# Patient Record
Sex: MALE | Race: WHITE | NOT HISPANIC OR LATINO | Employment: OTHER | URBAN - METROPOLITAN AREA
[De-identification: names, ages, dates, MRNs, and addresses within clinical notes are randomized per-mention and may not be internally consistent; named-entity substitution may affect disease eponyms.]

---

## 2020-12-07 ENCOUNTER — HOME CARE/HOSPICE - HEALTHEAST (OUTPATIENT)
Dept: HOME HEALTH SERVICES | Facility: HOME HEALTH | Age: 75
End: 2020-12-07

## 2021-05-26 ENCOUNTER — RECORDS - HEALTHEAST (OUTPATIENT)
Dept: ADMINISTRATIVE | Facility: CLINIC | Age: 76
End: 2021-05-26

## 2021-05-29 ENCOUNTER — RECORDS - HEALTHEAST (OUTPATIENT)
Dept: ADMINISTRATIVE | Facility: CLINIC | Age: 76
End: 2021-05-29

## 2021-05-31 ENCOUNTER — RECORDS - HEALTHEAST (OUTPATIENT)
Dept: ADMINISTRATIVE | Facility: CLINIC | Age: 76
End: 2021-05-31

## 2021-06-01 ENCOUNTER — RECORDS - HEALTHEAST (OUTPATIENT)
Dept: ADMINISTRATIVE | Facility: CLINIC | Age: 76
End: 2021-06-01

## 2021-06-16 PROBLEM — R06.00 DYSPNEA: Status: ACTIVE | Noted: 2020-12-01

## 2022-02-13 ENCOUNTER — HOSPITAL ENCOUNTER (INPATIENT)
Facility: HOSPITAL | Age: 77
LOS: 3 days | Discharge: HOME | DRG: 193 | End: 2022-02-16
Attending: EMERGENCY MEDICINE | Admitting: INTERNAL MEDICINE
Payer: COMMERCIAL

## 2022-02-13 ENCOUNTER — APPOINTMENT (OUTPATIENT)
Dept: CT IMAGING | Facility: HOSPITAL | Age: 77
DRG: 193 | End: 2022-02-13
Attending: STUDENT IN AN ORGANIZED HEALTH CARE EDUCATION/TRAINING PROGRAM
Payer: COMMERCIAL

## 2022-02-13 DIAGNOSIS — R33.8 POSTOPERATIVE URINARY RETENTION: ICD-10-CM

## 2022-02-13 DIAGNOSIS — J43.2 CENTRILOBULAR EMPHYSEMA (H): Primary | ICD-10-CM

## 2022-02-13 DIAGNOSIS — R09.02 HYPOXIA: ICD-10-CM

## 2022-02-13 DIAGNOSIS — N99.89 POSTOPERATIVE URINARY RETENTION: ICD-10-CM

## 2022-02-13 PROBLEM — N17.9 ACUTE KIDNEY INJURY (NONTRAUMATIC) (H): Status: ACTIVE | Noted: 2022-02-13

## 2022-02-13 PROBLEM — J41.0 SIMPLE CHRONIC BRONCHITIS (H): Status: ACTIVE | Noted: 2022-02-13

## 2022-02-13 PROBLEM — E78.00 HYPERCHOLESTEROLEMIA: Status: ACTIVE | Noted: 2022-02-13

## 2022-02-13 PROBLEM — J96.01 ACUTE RESPIRATORY FAILURE WITH HYPOXIA (H): Status: ACTIVE | Noted: 2020-12-02

## 2022-02-13 PROBLEM — J18.9 COMMUNITY ACQUIRED BILATERAL LOWER LOBE PNEUMONIA: Status: ACTIVE | Noted: 2022-02-13

## 2022-02-13 PROBLEM — H90.3 SENSORINEURAL HEARING LOSS, BILATERAL: Status: ACTIVE | Noted: 2018-07-13

## 2022-02-13 PROBLEM — M48.062 LUMBAR STENOSIS WITH NEUROGENIC CLAUDICATION: Status: ACTIVE | Noted: 2022-02-10

## 2022-02-13 PROBLEM — E11.42 DIABETIC PERIPHERAL NEUROPATHY (H): Status: ACTIVE | Noted: 2022-02-13

## 2022-02-13 PROBLEM — I10 HTN (HYPERTENSION): Status: ACTIVE | Noted: 2022-02-13

## 2022-02-13 PROBLEM — I48.91 ATRIAL FIBRILLATION (H): Status: ACTIVE | Noted: 2022-02-13

## 2022-02-13 LAB
ALBUMIN UR-MCNC: 10 MG/DL
ANION GAP SERPL CALCULATED.3IONS-SCNC: 14 MMOL/L (ref 5–18)
ANION GAP SERPL CALCULATED.3IONS-SCNC: 15 MMOL/L (ref 5–18)
APPEARANCE UR: CLEAR
BASOPHILS # BLD AUTO: 0.1 10E3/UL (ref 0–0.2)
BASOPHILS NFR BLD AUTO: 1 %
BILIRUB UR QL STRIP: NEGATIVE
BNP SERPL-MCNC: 17 PG/ML (ref 0–80)
BUN SERPL-MCNC: 45 MG/DL (ref 8–28)
BUN SERPL-MCNC: 45 MG/DL (ref 8–28)
CALCIUM SERPL-MCNC: 8.1 MG/DL (ref 8.5–10.5)
CALCIUM SERPL-MCNC: 8.5 MG/DL (ref 8.5–10.5)
CHLORIDE BLD-SCNC: 92 MMOL/L (ref 98–107)
CHLORIDE BLD-SCNC: 93 MMOL/L (ref 98–107)
CO2 SERPL-SCNC: 24 MMOL/L (ref 22–31)
CO2 SERPL-SCNC: 25 MMOL/L (ref 22–31)
COLOR UR AUTO: YELLOW
CREAT SERPL-MCNC: 2.19 MG/DL (ref 0.7–1.3)
CREAT SERPL-MCNC: 2.42 MG/DL (ref 0.7–1.3)
D DIMER PPP FEU-MCNC: 0.92 UG/ML FEU (ref 0–0.5)
EOSINOPHIL # BLD AUTO: 0.2 10E3/UL (ref 0–0.7)
EOSINOPHIL NFR BLD AUTO: 2 %
ERYTHROCYTE [DISTWIDTH] IN BLOOD BY AUTOMATED COUNT: 13.1 % (ref 10–15)
GFR SERPL CREATININE-BSD FRML MDRD: 27 ML/MIN/1.73M2
GFR SERPL CREATININE-BSD FRML MDRD: 30 ML/MIN/1.73M2
GLUCOSE BLD-MCNC: 109 MG/DL (ref 70–125)
GLUCOSE BLD-MCNC: 77 MG/DL (ref 70–125)
GLUCOSE UR STRIP-MCNC: NEGATIVE MG/DL
HCT VFR BLD AUTO: 34.5 % (ref 40–53)
HGB BLD-MCNC: 12.3 G/DL (ref 13.3–17.7)
HGB UR QL STRIP: NEGATIVE
HOLD SPECIMEN: NORMAL
HYALINE CASTS: 10 /LPF
IMM GRANULOCYTES # BLD: 0.1 10E3/UL
IMM GRANULOCYTES NFR BLD: 1 %
KETONES UR STRIP-MCNC: NEGATIVE MG/DL
LEUKOCYTE ESTERASE UR QL STRIP: NEGATIVE
LYMPHOCYTES # BLD AUTO: 2.2 10E3/UL (ref 0.8–5.3)
LYMPHOCYTES NFR BLD AUTO: 22 %
MCH RBC QN AUTO: 31.1 PG (ref 26.5–33)
MCHC RBC AUTO-ENTMCNC: 35.7 G/DL (ref 31.5–36.5)
MCV RBC AUTO: 87 FL (ref 78–100)
MONOCYTES # BLD AUTO: 1.1 10E3/UL (ref 0–1.3)
MONOCYTES NFR BLD AUTO: 11 %
MUCOUS THREADS #/AREA URNS LPF: PRESENT /LPF
NEUTROPHILS # BLD AUTO: 6.4 10E3/UL (ref 1.6–8.3)
NEUTROPHILS NFR BLD AUTO: 63 %
NITRATE UR QL: NEGATIVE
NRBC # BLD AUTO: 0 10E3/UL
NRBC BLD AUTO-RTO: 0 /100
PH UR STRIP: 5 [PH] (ref 5–7)
PLATELET # BLD AUTO: 217 10E3/UL (ref 150–450)
POTASSIUM BLD-SCNC: 3.3 MMOL/L (ref 3.5–5)
POTASSIUM BLD-SCNC: 3.7 MMOL/L (ref 3.5–5)
RBC # BLD AUTO: 3.96 10E6/UL (ref 4.4–5.9)
RBC URINE: <1 /HPF
SARS-COV-2 RNA RESP QL NAA+PROBE: NEGATIVE
SODIUM SERPL-SCNC: 131 MMOL/L (ref 136–145)
SODIUM SERPL-SCNC: 132 MMOL/L (ref 136–145)
SP GR UR STRIP: 1.02 (ref 1–1.03)
TROPONIN I SERPL-MCNC: 0.02 NG/ML (ref 0–0.29)
UROBILINOGEN UR STRIP-MCNC: <2 MG/DL
WBC # BLD AUTO: 10 10E3/UL (ref 4–11)
WBC URINE: <1 /HPF

## 2022-02-13 PROCEDURE — 80048 BASIC METABOLIC PNL TOTAL CA: CPT | Performed by: STUDENT IN AN ORGANIZED HEALTH CARE EDUCATION/TRAINING PROGRAM

## 2022-02-13 PROCEDURE — 250N000013 HC RX MED GY IP 250 OP 250 PS 637: Performed by: EMERGENCY MEDICINE

## 2022-02-13 PROCEDURE — 51798 US URINE CAPACITY MEASURE: CPT

## 2022-02-13 PROCEDURE — 36415 COLL VENOUS BLD VENIPUNCTURE: CPT | Performed by: EMERGENCY MEDICINE

## 2022-02-13 PROCEDURE — 99222 1ST HOSP IP/OBS MODERATE 55: CPT | Performed by: INTERNAL MEDICINE

## 2022-02-13 PROCEDURE — 250N000013 HC RX MED GY IP 250 OP 250 PS 637: Performed by: INTERNAL MEDICINE

## 2022-02-13 PROCEDURE — 99285 EMERGENCY DEPT VISIT HI MDM: CPT | Mod: 25

## 2022-02-13 PROCEDURE — 96367 TX/PROPH/DG ADDL SEQ IV INF: CPT

## 2022-02-13 PROCEDURE — 96376 TX/PRO/DX INJ SAME DRUG ADON: CPT

## 2022-02-13 PROCEDURE — 84484 ASSAY OF TROPONIN QUANT: CPT | Performed by: EMERGENCY MEDICINE

## 2022-02-13 PROCEDURE — 250N000011 HC RX IP 250 OP 636: Performed by: STUDENT IN AN ORGANIZED HEALTH CARE EDUCATION/TRAINING PROGRAM

## 2022-02-13 PROCEDURE — 81001 URINALYSIS AUTO W/SCOPE: CPT | Performed by: EMERGENCY MEDICINE

## 2022-02-13 PROCEDURE — 120N000001 HC R&B MED SURG/OB

## 2022-02-13 PROCEDURE — 258N000003 HC RX IP 258 OP 636: Performed by: HOSPITALIST

## 2022-02-13 PROCEDURE — 96375 TX/PRO/DX INJ NEW DRUG ADDON: CPT

## 2022-02-13 PROCEDURE — 85379 FIBRIN DEGRADATION QUANT: CPT | Performed by: EMERGENCY MEDICINE

## 2022-02-13 PROCEDURE — 258N000003 HC RX IP 258 OP 636: Performed by: STUDENT IN AN ORGANIZED HEALTH CARE EDUCATION/TRAINING PROGRAM

## 2022-02-13 PROCEDURE — 87635 SARS-COV-2 COVID-19 AMP PRB: CPT | Performed by: STUDENT IN AN ORGANIZED HEALTH CARE EDUCATION/TRAINING PROGRAM

## 2022-02-13 PROCEDURE — 85025 COMPLETE CBC W/AUTO DIFF WBC: CPT | Performed by: EMERGENCY MEDICINE

## 2022-02-13 PROCEDURE — 93005 ELECTROCARDIOGRAM TRACING: CPT | Performed by: EMERGENCY MEDICINE

## 2022-02-13 PROCEDURE — 51702 INSERT TEMP BLADDER CATH: CPT

## 2022-02-13 PROCEDURE — 71275 CT ANGIOGRAPHY CHEST: CPT

## 2022-02-13 PROCEDURE — 36415 COLL VENOUS BLD VENIPUNCTURE: CPT | Performed by: STUDENT IN AN ORGANIZED HEALTH CARE EDUCATION/TRAINING PROGRAM

## 2022-02-13 PROCEDURE — 96365 THER/PROPH/DIAG IV INF INIT: CPT | Mod: 59

## 2022-02-13 PROCEDURE — 96361 HYDRATE IV INFUSION ADD-ON: CPT

## 2022-02-13 PROCEDURE — 83880 ASSAY OF NATRIURETIC PEPTIDE: CPT | Performed by: EMERGENCY MEDICINE

## 2022-02-13 PROCEDURE — 96368 THER/DIAG CONCURRENT INF: CPT

## 2022-02-13 PROCEDURE — C9803 HOPD COVID-19 SPEC COLLECT: HCPCS

## 2022-02-13 PROCEDURE — 80048 BASIC METABOLIC PNL TOTAL CA: CPT | Performed by: EMERGENCY MEDICINE

## 2022-02-13 RX ORDER — SODIUM CHLORIDE 9 MG/ML
INJECTION, SOLUTION INTRAVENOUS ONCE
Status: COMPLETED | OUTPATIENT
Start: 2022-02-13 | End: 2022-02-13

## 2022-02-13 RX ORDER — MULTIPLE VITAMINS W/ MINERALS TAB 9MG-400MCG
1 TAB ORAL DAILY
COMMUNITY

## 2022-02-13 RX ORDER — CYCLOBENZAPRINE HCL 10 MG
10 TABLET ORAL 3 TIMES DAILY
Status: DISCONTINUED | OUTPATIENT
Start: 2022-02-14 | End: 2022-02-16 | Stop reason: HOSPADM

## 2022-02-13 RX ORDER — CYCLOBENZAPRINE HCL 10 MG
10 TABLET ORAL 3 TIMES DAILY PRN
COMMUNITY

## 2022-02-13 RX ORDER — OXYCODONE HYDROCHLORIDE 5 MG/1
5-10 TABLET ORAL EVERY 4 HOURS PRN
Status: ON HOLD | COMMUNITY
End: 2022-02-16

## 2022-02-13 RX ORDER — ASPIRIN 81 MG/1
81 TABLET ORAL EVERY EVENING
Status: DISCONTINUED | OUTPATIENT
Start: 2022-02-14 | End: 2022-02-14

## 2022-02-13 RX ORDER — OXYCODONE AND ACETAMINOPHEN 5; 325 MG/1; MG/1
1 TABLET ORAL ONCE
Status: COMPLETED | OUTPATIENT
Start: 2022-02-13 | End: 2022-02-13

## 2022-02-13 RX ORDER — MORPHINE SULFATE 4 MG/ML
4 INJECTION, SOLUTION INTRAMUSCULAR; INTRAVENOUS
Status: DISCONTINUED | OUTPATIENT
Start: 2022-02-13 | End: 2022-02-16 | Stop reason: HOSPADM

## 2022-02-13 RX ORDER — CEFTRIAXONE 1 G/1
1 INJECTION, POWDER, FOR SOLUTION INTRAMUSCULAR; INTRAVENOUS ONCE
Status: COMPLETED | OUTPATIENT
Start: 2022-02-13 | End: 2022-02-13

## 2022-02-13 RX ORDER — CEFTRIAXONE 1 G/1
1 INJECTION, POWDER, FOR SOLUTION INTRAMUSCULAR; INTRAVENOUS EVERY 24 HOURS
Status: DISCONTINUED | OUTPATIENT
Start: 2022-02-14 | End: 2022-02-16 | Stop reason: HOSPADM

## 2022-02-13 RX ORDER — LORATADINE 10 MG/1
10 TABLET ORAL DAILY PRN
COMMUNITY

## 2022-02-13 RX ORDER — ACETAMINOPHEN 325 MG/1
325-650 TABLET ORAL EVERY 6 HOURS PRN
COMMUNITY

## 2022-02-13 RX ORDER — TAMSULOSIN HYDROCHLORIDE 0.4 MG/1
0.4 CAPSULE ORAL DAILY
Status: DISCONTINUED | OUTPATIENT
Start: 2022-02-14 | End: 2022-02-16 | Stop reason: HOSPADM

## 2022-02-13 RX ORDER — OXYCODONE AND ACETAMINOPHEN 5; 325 MG/1; MG/1
1 TABLET ORAL EVERY 6 HOURS PRN
Status: DISCONTINUED | OUTPATIENT
Start: 2022-02-13 | End: 2022-02-16 | Stop reason: HOSPADM

## 2022-02-13 RX ORDER — DEXTROSE MONOHYDRATE 25 G/50ML
25-50 INJECTION, SOLUTION INTRAVENOUS
Status: DISCONTINUED | OUTPATIENT
Start: 2022-02-13 | End: 2022-02-16 | Stop reason: HOSPADM

## 2022-02-13 RX ORDER — POTASSIUM CHLORIDE 7.45 MG/ML
10 INJECTION INTRAVENOUS ONCE
Status: COMPLETED | OUTPATIENT
Start: 2022-02-13 | End: 2022-02-13

## 2022-02-13 RX ORDER — ROSUVASTATIN CALCIUM 10 MG/1
20 TABLET, COATED ORAL AT BEDTIME
Status: DISCONTINUED | OUTPATIENT
Start: 2022-02-14 | End: 2022-02-16 | Stop reason: HOSPADM

## 2022-02-13 RX ORDER — NICOTINE POLACRILEX 4 MG
15-30 LOZENGE BUCCAL
Status: DISCONTINUED | OUTPATIENT
Start: 2022-02-13 | End: 2022-02-16 | Stop reason: HOSPADM

## 2022-02-13 RX ORDER — HEPARIN SODIUM 5000 [USP'U]/.5ML
5000 INJECTION, SOLUTION INTRAVENOUS; SUBCUTANEOUS EVERY 12 HOURS
Status: DISCONTINUED | OUTPATIENT
Start: 2022-02-14 | End: 2022-02-14

## 2022-02-13 RX ORDER — IOPAMIDOL 755 MG/ML
75 INJECTION, SOLUTION INTRAVASCULAR ONCE
Status: COMPLETED | OUTPATIENT
Start: 2022-02-13 | End: 2022-02-13

## 2022-02-13 RX ORDER — GABAPENTIN 300 MG/1
300 CAPSULE ORAL 2 TIMES DAILY
COMMUNITY

## 2022-02-13 RX ORDER — SODIUM CHLORIDE AND POTASSIUM CHLORIDE 150; 900 MG/100ML; MG/100ML
INJECTION, SOLUTION INTRAVENOUS CONTINUOUS
Status: DISPENSED | OUTPATIENT
Start: 2022-02-13 | End: 2022-02-14

## 2022-02-13 RX ORDER — ASPIRIN 81 MG/1
81 TABLET ORAL EVERY EVENING
COMMUNITY

## 2022-02-13 RX ORDER — SODIUM CHLORIDE, SODIUM LACTATE, POTASSIUM CHLORIDE, CALCIUM CHLORIDE 600; 310; 30; 20 MG/100ML; MG/100ML; MG/100ML; MG/100ML
INJECTION, SOLUTION INTRAVENOUS ONCE
Status: DISCONTINUED | OUTPATIENT
Start: 2022-02-13 | End: 2022-02-13

## 2022-02-13 RX ADMIN — IOPAMIDOL 75 ML: 755 INJECTION, SOLUTION INTRAVENOUS at 18:35

## 2022-02-13 RX ADMIN — SODIUM CHLORIDE: 9 INJECTION, SOLUTION INTRAVENOUS at 20:31

## 2022-02-13 RX ADMIN — OXYCODONE HYDROCHLORIDE AND ACETAMINOPHEN 1 TABLET: 5; 325 TABLET ORAL at 14:17

## 2022-02-13 RX ADMIN — AZITHROMYCIN MONOHYDRATE 500 MG: 500 INJECTION, POWDER, LYOPHILIZED, FOR SOLUTION INTRAVENOUS at 20:31

## 2022-02-13 RX ADMIN — POTASSIUM CHLORIDE 10 MEQ: 7.46 INJECTION, SOLUTION INTRAVENOUS at 19:53

## 2022-02-13 RX ADMIN — OXYCODONE HYDROCHLORIDE AND ACETAMINOPHEN 1 TABLET: 5; 325 TABLET ORAL at 22:39

## 2022-02-13 RX ADMIN — MORPHINE SULFATE 4 MG: 4 INJECTION, SOLUTION INTRAMUSCULAR; INTRAVENOUS at 19:26

## 2022-02-13 RX ADMIN — SODIUM CHLORIDE, POTASSIUM CHLORIDE, SODIUM LACTATE AND CALCIUM CHLORIDE 1000 ML: 600; 310; 30; 20 INJECTION, SOLUTION INTRAVENOUS at 16:43

## 2022-02-13 RX ADMIN — MORPHINE SULFATE 4 MG: 4 INJECTION, SOLUTION INTRAMUSCULAR; INTRAVENOUS at 22:05

## 2022-02-13 RX ADMIN — CEFTRIAXONE SODIUM 1 G: 1 INJECTION, POWDER, FOR SOLUTION INTRAMUSCULAR; INTRAVENOUS at 19:28

## 2022-02-13 RX ADMIN — SODIUM CHLORIDE 1000 ML: 9 INJECTION, SOLUTION INTRAVENOUS at 22:40

## 2022-02-13 ASSESSMENT — ACTIVITIES OF DAILY LIVING (ADL)
ADLS_ACUITY_SCORE: 9
DEPENDENT_IADLS:: INDEPENDENT
ADLS_ACUITY_SCORE: 9

## 2022-02-13 ASSESSMENT — MIFFLIN-ST. JEOR: SCORE: 1695.72

## 2022-02-13 NOTE — ED PROVIDER NOTES
EMERGENCY DEPARTMENT ENCOUNTER      NAME: Aristeo Henry  AGE: 76 year old male  YOB: 1945  MRN: [id number]  EVALUATION DATE & TIME: 2022  1:08 PM    PCP: Kevan Srivastava    ED PROVIDER: Aristeo Rodriguez M.D.      Chief Complaint   Patient presents with     unable to void post surgery       FINAL IMPRESSION:  1. Postoperative urinary retention    2. Hypoxia        ED COURSE & MEDICAL DECISION MAKIN year old male presents to the Emergency Department for evaluation of urinary retention.  He had uncomplicated L3-L5 facetectomy and decompression done 3 days ago at [facility].  He arrives to the emergency department today concerned about urinary retention.  It sounds like this was an issue following surgery as well.  Killian catheter was placed and bladder volume was more than 800 cc.  Reviewed the case with Dr. Ferrell  who is on-call for the [facility] this weekend.  With no drainage, fever, increasing back pain, or lower extremity weakness or numbness, I do not have any suspicion that this is related to any kind of spinal cord compression or postoperative complication and the surgeon was in agreement.  I think this is postoperative urinary retention with no other evidence of complication.      Patient concerningly has hypoxia, with saturations in the 88% range.  Placed on 2 L supplemental oxygen here, oxygen saturations returned to the mid 90s.  He reports that he has a history of hypoxia in the past, was previously recommended for nocturnal oxygen.  I can see that at his preoperative visit he was 95% on room air however.  His cardiopulmonary exam is unremarkable.  Patient does not feel subjectively dyspneic.  Is not complaining of any chest pain.  He was agreeable to some additional work-up for hypoxemia here.  His EKG shows sinus rhythm with no ischemic changes.  Remainder of his labs including troponin, BNP, hemoglobin, and a CT pulmonary  "angiogram are pending.  Patient will require reevaluation after his testing is complete.  This evaluation was signed out to Dr. Rubio at shift change, refer to his note for remainder of ED course and ultimate disposition.    1:16 PM I met the patient and performed my initial interview and exam.   1:36 PM Spoke to on call provider for Dr. Ferrell , surgeon, regarding patient's previous back surgery.     At the conclusion of the encounter I discussed the results of all of the tests and the disposition. The questions were answered. The patient or family acknowledged understanding and was agreeable with the care plan.       MEDICATIONS GIVEN IN THE EMERGENCY:  Medications   oxyCODONE-acetaminophen (PERCOCET) 5-325 MG per tablet 1 tablet (1 tablet Oral Given 2/13/22 1417)       NEW PRESCRIPTIONS STARTED AT TODAY'S ER VISIT  New Prescriptions    No medications on file          =================================================================    HPI    Patient information was obtained from: Patient     Use of : N/A       Aristeo Henry is a 76 year old male with a pertinent history of type II diabetes, dyspnea, acute respiratory failure with hypoxia, atrial fibrillation, and mitral valve prolapse who presents to this ED via walk in for evaluation of inability to urinate.     Per chart review: Patient was seen at [facility] on 2/10/22 for an L3-L5 bilateral medial facetectomy and decompression. Patient was then placed supine on his bed and extubated without complications, brought to recovery room in satisfactory condition after surgery.      Patient states that since discharge after surgery on 2/11/22 he has not been able to urinate on his own. He reports that he had a catheter placed before he was discharged from the hospital, and he has not been able to urinate since. He is experiencing severe abdominal pain and just \"feels full.\" Patient denies any weakness or numbness.  Patient endorses having low " oxygen levels and being recommenced home oxygen, but he declined and states that taking deep breaths makes it better. Patient denies any other complaints at this time.      REVIEW OF SYSTEMS   Positive for abdominal pain, urinary retention, and low O2. Negative for numbness and weakness.   All systems reviewed and negative except as noted in HPI.    PAST MEDICAL HISTORY:  No past medical history on file.    PAST SURGICAL HISTORY:  No past surgical history on file.        CURRENT MEDICATIONS:    No current facility-administered medications for this encounter.     Current Outpatient Medications   Medication     albuterol (PROAIR HFA;PROVENTIL HFA;VENTOLIN HFA) 90 mcg/actuation inhaler     amLODIPine (NORVASC) 10 MG tablet     apixaban ANTICOAGULANT (ELIQUIS) 2.5 mg Tab tablet     atenoloL (TENORMIN) 100 MG tablet     carBAMazepine (TEGRETOL) 200 mg tablet     chlorthalidone (HYGROTEN) 25 MG tablet     dexAMETHasone (DECADRON) 2 MG tablet     insulin glargine (LANTUS SOLOSTAR PEN) 100 unit/mL (3 mL) pen     losartan (COZAAR) 100 MG tablet     rosuvastatin (CRESTOR) 20 MG tablet         ALLERGIES:  No Known Allergies    FAMILY HISTORY:  No family history on file.    SOCIAL HISTORY:   Social History     Socioeconomic History     Marital status:      Spouse name: Not on file     Number of children: Not on file     Years of education: Not on file     Highest education level: Not on file   Occupational History     Not on file   Tobacco Use     Smoking status: Not on file     Smokeless tobacco: Not on file   Substance and Sexual Activity     Alcohol use: Not on file     Drug use: Not on file     Sexual activity: Not on file   Other Topics Concern     Not on file   Social History Narrative     Not on file     Social Determinants of Health     Financial Resource Strain: Not on file   Food Insecurity: Not on file   Transportation Needs: Not on file   Physical Activity: Not on file   Stress: Not on file   Social  "Connections: Not on file   Intimate Partner Violence: Not on file   Housing Stability: Not on file       VITALS:  BP 98/51   Pulse 56   Temp 97.7  F (36.5  C) (Temporal)   Resp 15   Ht 1.695 m (5' 6.75\")   Wt 101.6 kg (224 lb)   SpO2 96%   BMI 35.35 kg/m      PHYSICAL EXAM    Constitutional: Well developed, Well nourished, NAD.  HENT: Normocephalic, Atraumatic. Neck Supple.  Eyes: EOMI, Conjunctiva normal.  Respiratory: Breathing comfortably on room air. Speaks full sentences easily. Lungs clear to ascultation.  Cardiovascular: Normal heart rate, Regular rhythm. No peripheral edema.  Abdomen: Soft, suprapubic tenderness and fullness.  Musculoskeletal: Good range of motion in all major joints. No major deformities noted.  There is a midline lumbar surgical incision which is clean dry and intact.  No erythema.  No drainage.  Integument: Warm, Dry.  Neurologic: Alert & awake, Normal motor function, Normal sensory function, No focal deficits noted.  Strength is full and symmetric, hip flexion is slightly limited by pain but is symmetric throughout bilateral lower extremities.  Sensation to light touch is intact including no saddle anesthesia.  Psychiatric: Cooperative. Affect appropriate.     LAB:  All pertinent labs reviewed and interpreted.  Labs Ordered and Resulted from Time of ED Arrival to Time of ED Departure   ROUTINE UA WITH MICROSCOPIC REFLEX TO CULTURE - Abnormal       Result Value    Color Urine Yellow      Appearance Urine Clear      Glucose Urine Negative      Bilirubin Urine Negative      Ketones Urine Negative      Specific Gravity Urine 1.017      Blood Urine Negative      pH Urine 5.0      Protein Albumin Urine 10  (*)     Urobilinogen Urine <2.0      Nitrite Urine Negative      Leukocyte Esterase Urine Negative      Mucus Urine Present (*)     RBC Urine <1      WBC Urine <1      Hyaline Casts Urine 10 (*)    CBC WITH PLATELETS AND DIFFERENTIAL - Abnormal    WBC Count 10.0      RBC Count 3.96 " (*)     Hemoglobin 12.3 (*)     Hematocrit 34.5 (*)     MCV 87      MCH 31.1      MCHC 35.7      RDW 13.1      Platelet Count 217      % Neutrophils 63      % Lymphocytes 22      % Monocytes 11      % Eosinophils 2      % Basophils 1      % Immature Granulocytes 1      NRBCs per 100 WBC 0      Absolute Neutrophils 6.4      Absolute Lymphocytes 2.2      Absolute Monocytes 1.1      Absolute Eosinophils 0.2      Absolute Basophils 0.1      Absolute Immature Granulocytes 0.1      Absolute NRBCs 0.0     BASIC METABOLIC PANEL   B-TYPE NATRIURETIC PEPTIDE ([facility] ONLY)   D DIMER QUANTITATIVE   TROPONIN I       RADIOLOGY:  Reviewed all pertinent imaging. Please see official radiology report.  CT Chest Pulmonary Embolism w Contrast    (Results Pending)       EKG:    Performed at: 14:06    Impression: Sinus bradycardia, left anterior fascicular block    Rate: 57  Rhythm: Sinus bradycardia  Axis: -54  MS Interval: 152  QRS Interval: 114  QTc Interval: 432  ST Changes: No ST elevations or depressions   Comparison: When compared to previous on 12/01/20, left anterior fascicular block is now present     I have independently reviewed and interpreted the EKG(s) documented above.        I, Steph Aggarwal, am serving as a scribe to document services personally performed by Dr. Aristeo Rodriguez, based on my observation and the provider's statements to me. I, Aristeo Rodriguez MD attest that Steph Aggarwal is acting in a scribe capacity, has observed my performance of the services and has documented them in accordance with my direction.    Aristeo Rodriguez M.D.  Emergency Medicine  [facility] EMERGENCY DEPARTMENT  [address] [place] [id number]  [phone]  Dept: [phone]     Aristeo Rodriguez MD  02/13/22 2769

## 2022-02-13 NOTE — ED TRIAGE NOTES
Patient comes to ED for evaluation of not being able to void since discharged yesterday for back surgery at [facility]. Hx; bladder cancer.

## 2022-02-13 NOTE — ED PROVIDER NOTES
"ED SIGNOUT  Date/Time:2/13/2022 3:19 PM    Patient signed out to me by my colleague, Aristeo Rodriguez MD.  Please see their note for complete history and physical. Plan to follow up on labs, chest CT, and disposition.      Briefly, Aristeo Henry is a 76 year old male with a history of DM II with diabetic neuropathy, atrial fibrillation anticoagulated on Eliquis, and HTN, who presented for evaluation of urinary retention with associated severe abdominal pain s/p L3-L5 bilateral medial facetectomy and decompression on 2/10 (3 days ago). Patient had a catheter placed prior to discharge and has been unable to void since.     The creation of this record is based on the scribe s observations of the work being performed by Mir Rubio DO and the provider s statements to them. It was created on their behalf by Kasey carreno trained medical scribe. This document has been checked and approved by the attending provider.      REMAINING ED WORKUP:    Vitals:  /55   Pulse 58   Temp 97.7  F (36.5  C) (Temporal)   Resp 15   Ht 1.695 m (5' 6.75\")   Wt 101.6 kg (224 lb)   SpO2 92%   BMI 35.35 kg/m        Pertinent labs results reviewed   Results for orders placed or performed during the hospital encounter of 02/13/22   CT Chest Pulmonary Embolism w Contrast    Impression    IMPRESSION:  1.  No pulmonary emboli. Normal thoracic aorta.  2.  Mild mid and upper lung centrilobular emphysema.  3.  Mural and mucosal thickening of the bronchi with some endobronchial secretions most marked in the basilar segments of both lower lobes where there is some lung consolidation posteriorly compatible with developing bronchopneumonia.   Extra Blue Top Tube   Result Value Ref Range    Hold Specimen JIC    Extra Red Top Tube   Result Value Ref Range    Hold Specimen JIC    Extra Green Top (Lithium Heparin) Tube   Result Value Ref Range    Hold Specimen JIC    Extra Purple Top Tube   Result Value Ref Range    Hold Specimen JIC  "   UA with Microscopic reflex to Culture    Specimen: Urine, Killian Catheter   Result Value Ref Range    Color Urine Yellow Colorless, Straw, Light Yellow, Yellow    Appearance Urine Clear Clear    Glucose Urine Negative Negative mg/dL    Bilirubin Urine Negative Negative    Ketones Urine Negative Negative mg/dL    Specific Gravity Urine 1.017 1.001 - 1.030    Blood Urine Negative Negative    pH Urine 5.0 5.0 - 7.0    Protein Albumin Urine 10  (A) Negative mg/dL    Urobilinogen Urine <2.0 <2.0 mg/dL    Nitrite Urine Negative Negative    Leukocyte Esterase Urine Negative Negative    Mucus Urine Present (A) None Seen /LPF    RBC Urine <1 <=2 /HPF    WBC Urine <1 <=5 /HPF    Hyaline Casts Urine 10 (H) <=2 /LPF   Basic metabolic panel   Result Value Ref Range    Sodium 132 (L) 136 - 145 mmol/L    Potassium 3.7 3.5 - 5.0 mmol/L    Chloride 92 (L) 98 - 107 mmol/L    Carbon Dioxide (CO2) 25 22 - 31 mmol/L    Anion Gap 15 5 - 18 mmol/L    Urea Nitrogen 45 (H) 8 - 28 mg/dL    Creatinine 2.42 (H) 0.70 - 1.30 mg/dL    Calcium 8.5 8.5 - 10.5 mg/dL    Glucose 109 70 - 125 mg/dL    GFR Estimate 27 (L) >60 mL/min/1.73m2   B-Type Natriuretic Peptide ([facility] Only)   Result Value Ref Range    BNP 17 0 - 80 pg/mL   D dimer quantitative   Result Value Ref Range    D-Dimer Quantitative 0.92 (H) 0.00 - 0.50 ug/mL [facility]   Troponin I (now)   Result Value Ref Range    Troponin I 0.02 0.00 - 0.29 ng/mL   CBC with platelets and differential   Result Value Ref Range    WBC Count 10.0 4.0 - 11.0 10e3/uL    RBC Count 3.96 (L) 4.40 - 5.90 10e6/uL    Hemoglobin 12.3 (L) 13.3 - 17.7 g/dL    Hematocrit 34.5 (L) 40.0 - 53.0 %    MCV 87 78 - 100 fL    MCH 31.1 26.5 - 33.0 pg    MCHC 35.7 31.5 - 36.5 g/dL    RDW 13.1 10.0 - 15.0 %    Platelet Count 217 150 - 450 10e3/uL    % Neutrophils 63 %    % Lymphocytes 22 %    % Monocytes 11 %    % Eosinophils 2 %    % Basophils 1 %    % Immature Granulocytes 1 %    NRBCs per 100 WBC 0 <1 /100    Absolute  Neutrophils 6.4 1.6 - 8.3 10e3/uL    Absolute Lymphocytes 2.2 0.8 - 5.3 10e3/uL    Absolute Monocytes 1.1 0.0 - 1.3 10e3/uL    Absolute Eosinophils 0.2 0.0 - 0.7 10e3/uL    Absolute Basophils 0.1 0.0 - 0.2 10e3/uL    Absolute Immature Granulocytes 0.1 <=0.4 10e3/uL    Absolute NRBCs 0.0 10e3/uL   Basic metabolic panel   Result Value Ref Range    Sodium 131 (L) 136 - 145 mmol/L    Potassium 3.3 (L) 3.5 - 5.0 mmol/L    Chloride 93 (L) 98 - 107 mmol/L    Carbon Dioxide (CO2) 24 22 - 31 mmol/L    Anion Gap 14 5 - 18 mmol/L    Urea Nitrogen 45 (H) 8 - 28 mg/dL    Creatinine 2.19 (H) 0.70 - 1.30 mg/dL    Calcium 8.1 (L) 8.5 - 10.5 mg/dL    Glucose 77 70 - 125 mg/dL    GFR Estimate 30 (L) >60 mL/min/1.73m2       Pertinent imaging reviewed   Please see official radiology report.  CT Chest Pulmonary Embolism w Contrast   Preliminary Result   IMPRESSION:   1.  No pulmonary emboli. Normal thoracic aorta.   2.  Mild mid and upper lung centrilobular emphysema.   3.  Mural and mucosal thickening of the bronchi with some endobronchial secretions most marked in the basilar segments of both lower lobes where there is some lung consolidation posteriorly compatible with developing bronchopneumonia.           Interventions  Medications   oxyCODONE-acetaminophen (PERCOCET) 5-325 MG per tablet 1 tablet (1 tablet Oral Given 2/13/22 1417)   lactated ringers BOLUS 1,000 mL (0 mLs Intravenous Stopped 2/13/22 1725)   iopamidol (ISOVUE-370) solution 75 mL (75 mLs Intravenous Given 2/13/22 1835)        ED Course/MDM:  3:05 PM Signout accepted from Dr. Rodriguez.  Prior records were reviewed.  Diagnostics from this visit are reviewed.  6:50 PM I rechecked and updated the patient on lab and imaging results.     -Labs reviewed.  GFR low.  We will give him fluids and repeat BMP.  I suspect the creatinine is elevated because of his postobstructive nephropathy.  Killian in place.  I suspect that with fluids and repeat BMP his creatinine will improve/GFR  improved so we can CT PE his chest  -CT PE showing bronchopneumonia.  We will continue fluids.  Add antibiotics.  Plan for admission.  Patient otherwise stable.           1. Postoperative urinary retention    2. Hypoxia          Mir Rubio DO  [facility] EMERGENCY DEPARTMENT   [address] [place] [id number]  [phone]         Mir Rubio DO  02/13/22 6070

## 2022-02-14 ENCOUNTER — APPOINTMENT (OUTPATIENT)
Dept: OCCUPATIONAL THERAPY | Facility: HOSPITAL | Age: 77
DRG: 193 | End: 2022-02-14
Attending: INTERNAL MEDICINE
Payer: COMMERCIAL

## 2022-02-14 LAB
ANION GAP SERPL CALCULATED.3IONS-SCNC: 8 MMOL/L (ref 5–18)
BUN SERPL-MCNC: 38 MG/DL (ref 8–28)
CALCIUM SERPL-MCNC: 7.7 MG/DL (ref 8.5–10.5)
CHLORIDE BLD-SCNC: 99 MMOL/L (ref 98–107)
CO2 SERPL-SCNC: 27 MMOL/L (ref 22–31)
CREAT SERPL-MCNC: 1.25 MG/DL (ref 0.7–1.3)
GFR SERPL CREATININE-BSD FRML MDRD: 59 ML/MIN/1.73M2
GLUCOSE BLD-MCNC: 105 MG/DL (ref 70–125)
GLUCOSE BLDC GLUCOMTR-MCNC: 103 MG/DL (ref 70–99)
GLUCOSE BLDC GLUCOMTR-MCNC: 129 MG/DL (ref 70–99)
GLUCOSE BLDC GLUCOMTR-MCNC: 153 MG/DL (ref 70–99)
GLUCOSE BLDC GLUCOMTR-MCNC: 156 MG/DL (ref 70–99)
GLUCOSE BLDC GLUCOMTR-MCNC: 97 MG/DL (ref 70–99)
POTASSIUM BLD-SCNC: 3.5 MMOL/L (ref 3.5–5)
SODIUM SERPL-SCNC: 134 MMOL/L (ref 136–145)

## 2022-02-14 PROCEDURE — 96367 TX/PROPH/DG ADDL SEQ IV INF: CPT

## 2022-02-14 PROCEDURE — 97165 OT EVAL LOW COMPLEX 30 MIN: CPT | Mod: GO

## 2022-02-14 PROCEDURE — 250N000013 HC RX MED GY IP 250 OP 250 PS 637: Performed by: INTERNAL MEDICINE

## 2022-02-14 PROCEDURE — 120N000001 HC R&B MED SURG/OB

## 2022-02-14 PROCEDURE — 97535 SELF CARE MNGMENT TRAINING: CPT | Mod: GO

## 2022-02-14 PROCEDURE — 250N000013 HC RX MED GY IP 250 OP 250 PS 637: Performed by: STUDENT IN AN ORGANIZED HEALTH CARE EDUCATION/TRAINING PROGRAM

## 2022-02-14 PROCEDURE — 250N000011 HC RX IP 250 OP 636: Performed by: STUDENT IN AN ORGANIZED HEALTH CARE EDUCATION/TRAINING PROGRAM

## 2022-02-14 PROCEDURE — 250N000011 HC RX IP 250 OP 636: Performed by: HOSPITALIST

## 2022-02-14 PROCEDURE — 96366 THER/PROPH/DIAG IV INF ADDON: CPT

## 2022-02-14 PROCEDURE — 250N000012 HC RX MED GY IP 250 OP 636 PS 637: Performed by: INTERNAL MEDICINE

## 2022-02-14 PROCEDURE — 250N000011 HC RX IP 250 OP 636: Performed by: INTERNAL MEDICINE

## 2022-02-14 PROCEDURE — 96376 TX/PRO/DX INJ SAME DRUG ADON: CPT

## 2022-02-14 PROCEDURE — 36415 COLL VENOUS BLD VENIPUNCTURE: CPT | Performed by: INTERNAL MEDICINE

## 2022-02-14 PROCEDURE — 80048 BASIC METABOLIC PNL TOTAL CA: CPT | Performed by: INTERNAL MEDICINE

## 2022-02-14 PROCEDURE — 258N000003 HC RX IP 258 OP 636: Performed by: INTERNAL MEDICINE

## 2022-02-14 PROCEDURE — 97530 THERAPEUTIC ACTIVITIES: CPT | Mod: GO

## 2022-02-14 PROCEDURE — 99232 SBSQ HOSP IP/OBS MODERATE 35: CPT | Performed by: STUDENT IN AN ORGANIZED HEALTH CARE EDUCATION/TRAINING PROGRAM

## 2022-02-14 PROCEDURE — 250N000013 HC RX MED GY IP 250 OP 250 PS 637: Performed by: HOSPITALIST

## 2022-02-14 RX ORDER — AMOXICILLIN 250 MG
2 CAPSULE ORAL 2 TIMES DAILY PRN
Status: DISCONTINUED | OUTPATIENT
Start: 2022-02-14 | End: 2022-02-16 | Stop reason: HOSPADM

## 2022-02-14 RX ORDER — CYCLOBENZAPRINE HCL 10 MG
10 TABLET ORAL 3 TIMES DAILY PRN
Status: DISCONTINUED | OUTPATIENT
Start: 2022-02-14 | End: 2022-02-16 | Stop reason: HOSPADM

## 2022-02-14 RX ORDER — ATENOLOL 25 MG/1
100 TABLET ORAL DAILY
Status: DISCONTINUED | OUTPATIENT
Start: 2022-02-14 | End: 2022-02-16 | Stop reason: HOSPADM

## 2022-02-14 RX ORDER — HEPARIN SODIUM 5000 [USP'U]/.5ML
5000 INJECTION, SOLUTION INTRAVENOUS; SUBCUTANEOUS EVERY 12 HOURS
Status: DISCONTINUED | OUTPATIENT
Start: 2022-02-14 | End: 2022-02-16 | Stop reason: HOSPADM

## 2022-02-14 RX ORDER — ONDANSETRON 2 MG/ML
4 INJECTION INTRAMUSCULAR; INTRAVENOUS EVERY 6 HOURS PRN
Status: DISCONTINUED | OUTPATIENT
Start: 2022-02-14 | End: 2022-02-16 | Stop reason: HOSPADM

## 2022-02-14 RX ORDER — NALOXONE HYDROCHLORIDE 0.4 MG/ML
0.2 INJECTION, SOLUTION INTRAMUSCULAR; INTRAVENOUS; SUBCUTANEOUS
Status: DISCONTINUED | OUTPATIENT
Start: 2022-02-14 | End: 2022-02-14

## 2022-02-14 RX ORDER — AMLODIPINE BESYLATE 5 MG/1
10 TABLET ORAL DAILY
Status: DISCONTINUED | OUTPATIENT
Start: 2022-02-14 | End: 2022-02-16 | Stop reason: HOSPADM

## 2022-02-14 RX ORDER — NALOXONE HYDROCHLORIDE 0.4 MG/ML
0.2 INJECTION, SOLUTION INTRAMUSCULAR; INTRAVENOUS; SUBCUTANEOUS
Status: DISCONTINUED | OUTPATIENT
Start: 2022-02-14 | End: 2022-02-16 | Stop reason: HOSPADM

## 2022-02-14 RX ORDER — NALOXONE HYDROCHLORIDE 0.4 MG/ML
0.4 INJECTION, SOLUTION INTRAMUSCULAR; INTRAVENOUS; SUBCUTANEOUS
Status: DISCONTINUED | OUTPATIENT
Start: 2022-02-14 | End: 2022-02-16 | Stop reason: HOSPADM

## 2022-02-14 RX ORDER — KETOROLAC TROMETHAMINE 30 MG/ML
15 INJECTION, SOLUTION INTRAMUSCULAR; INTRAVENOUS ONCE
Status: COMPLETED | OUTPATIENT
Start: 2022-02-14 | End: 2022-02-14

## 2022-02-14 RX ORDER — LIDOCAINE 40 MG/G
CREAM TOPICAL
Status: DISCONTINUED | OUTPATIENT
Start: 2022-02-14 | End: 2022-02-16 | Stop reason: HOSPADM

## 2022-02-14 RX ORDER — ROSUVASTATIN CALCIUM 10 MG/1
20 TABLET, COATED ORAL AT BEDTIME
Status: DISCONTINUED | OUTPATIENT
Start: 2022-02-14 | End: 2022-02-14

## 2022-02-14 RX ORDER — AMOXICILLIN 250 MG
1 CAPSULE ORAL 2 TIMES DAILY PRN
Status: DISCONTINUED | OUTPATIENT
Start: 2022-02-14 | End: 2022-02-16 | Stop reason: HOSPADM

## 2022-02-14 RX ORDER — LOSARTAN POTASSIUM 50 MG/1
100 TABLET ORAL DAILY
Status: DISCONTINUED | OUTPATIENT
Start: 2022-02-14 | End: 2022-02-16 | Stop reason: HOSPADM

## 2022-02-14 RX ORDER — ACETAMINOPHEN 325 MG/1
975 TABLET ORAL 3 TIMES DAILY
Status: DISCONTINUED | OUTPATIENT
Start: 2022-02-14 | End: 2022-02-16 | Stop reason: HOSPADM

## 2022-02-14 RX ORDER — ACETAMINOPHEN 325 MG/1
650 TABLET ORAL ONCE
Status: COMPLETED | OUTPATIENT
Start: 2022-02-14 | End: 2022-02-14

## 2022-02-14 RX ORDER — NALOXONE HYDROCHLORIDE 0.4 MG/ML
0.4 INJECTION, SOLUTION INTRAMUSCULAR; INTRAVENOUS; SUBCUTANEOUS
Status: DISCONTINUED | OUTPATIENT
Start: 2022-02-14 | End: 2022-02-14

## 2022-02-14 RX ORDER — GABAPENTIN 300 MG/1
300 CAPSULE ORAL 2 TIMES DAILY
Status: DISCONTINUED | OUTPATIENT
Start: 2022-02-14 | End: 2022-02-16 | Stop reason: HOSPADM

## 2022-02-14 RX ORDER — ASPIRIN 81 MG/1
81 TABLET ORAL EVERY EVENING
Status: DISCONTINUED | OUTPATIENT
Start: 2022-02-14 | End: 2022-02-16 | Stop reason: HOSPADM

## 2022-02-14 RX ORDER — ONDANSETRON 4 MG/1
4 TABLET, ORALLY DISINTEGRATING ORAL EVERY 6 HOURS PRN
Status: DISCONTINUED | OUTPATIENT
Start: 2022-02-14 | End: 2022-02-16 | Stop reason: HOSPADM

## 2022-02-14 RX ORDER — LORATADINE 10 MG/1
10 TABLET ORAL DAILY PRN
Status: DISCONTINUED | OUTPATIENT
Start: 2022-02-14 | End: 2022-02-16 | Stop reason: HOSPADM

## 2022-02-14 RX ADMIN — DOCUSATE SODIUM AND SENNOSIDES 1 TABLET: 8.6; 5 TABLET, FILM COATED ORAL at 16:52

## 2022-02-14 RX ADMIN — KETOROLAC TROMETHAMINE 15 MG: 30 INJECTION, SOLUTION INTRAMUSCULAR at 19:55

## 2022-02-14 RX ADMIN — ROSUVASTATIN CALCIUM 20 MG: 10 TABLET, FILM COATED ORAL at 21:19

## 2022-02-14 RX ADMIN — MORPHINE SULFATE 4 MG: 4 INJECTION, SOLUTION INTRAMUSCULAR; INTRAVENOUS at 08:12

## 2022-02-14 RX ADMIN — ACETAMINOPHEN 650 MG: 325 TABLET ORAL at 03:16

## 2022-02-14 RX ADMIN — ROSUVASTATIN CALCIUM 20 MG: 10 TABLET, FILM COATED ORAL at 01:01

## 2022-02-14 RX ADMIN — CARBAMAZEPINE 100 MG: 200 TABLET ORAL at 21:19

## 2022-02-14 RX ADMIN — INSULIN GLARGINE 10 UNITS: 100 INJECTION, SOLUTION SUBCUTANEOUS at 01:14

## 2022-02-14 RX ADMIN — ASPIRIN 81 MG: 81 TABLET, COATED ORAL at 21:22

## 2022-02-14 RX ADMIN — HEPARIN SODIUM 5000 UNITS: 10000 INJECTION, SOLUTION INTRAVENOUS; SUBCUTANEOUS at 11:30

## 2022-02-14 RX ADMIN — CARBAMAZEPINE 100 MG: 200 TABLET ORAL at 08:04

## 2022-02-14 RX ADMIN — TAMSULOSIN HYDROCHLORIDE 0.4 MG: 0.4 CAPSULE ORAL at 08:05

## 2022-02-14 RX ADMIN — INSULIN GLARGINE 10 UNITS: 100 INJECTION, SOLUTION SUBCUTANEOUS at 22:26

## 2022-02-14 RX ADMIN — MORPHINE SULFATE 4 MG: 4 INJECTION, SOLUTION INTRAMUSCULAR; INTRAVENOUS at 01:39

## 2022-02-14 RX ADMIN — POTASSIUM CHLORIDE AND SODIUM CHLORIDE: 900; 150 INJECTION, SOLUTION INTRAVENOUS at 00:58

## 2022-02-14 RX ADMIN — ASPIRIN 81 MG: 81 TABLET, COATED ORAL at 01:01

## 2022-02-14 RX ADMIN — AZITHROMYCIN MONOHYDRATE 500 MG: 500 INJECTION, POWDER, LYOPHILIZED, FOR SOLUTION INTRAVENOUS at 21:20

## 2022-02-14 RX ADMIN — ACETAMINOPHEN 975 MG: 325 TABLET ORAL at 19:54

## 2022-02-14 RX ADMIN — ACETAMINOPHEN 975 MG: 325 TABLET ORAL at 08:04

## 2022-02-14 RX ADMIN — CYCLOBENZAPRINE 10 MG: 10 TABLET, FILM COATED ORAL at 08:04

## 2022-02-14 RX ADMIN — CEFTRIAXONE SODIUM 1 G: 1 INJECTION, POWDER, FOR SOLUTION INTRAMUSCULAR; INTRAVENOUS at 22:48

## 2022-02-14 RX ADMIN — CYCLOBENZAPRINE 10 MG: 10 TABLET, FILM COATED ORAL at 13:26

## 2022-02-14 RX ADMIN — CYCLOBENZAPRINE 10 MG: 10 TABLET, FILM COATED ORAL at 19:54

## 2022-02-14 RX ADMIN — ACETAMINOPHEN 975 MG: 325 TABLET ORAL at 13:26

## 2022-02-14 RX ADMIN — INSULIN ASPART 1 UNITS: 100 INJECTION, SOLUTION INTRAVENOUS; SUBCUTANEOUS at 17:24

## 2022-02-14 RX ADMIN — CARBAMAZEPINE 100 MG: 200 TABLET ORAL at 01:01

## 2022-02-14 RX ADMIN — HEPARIN SODIUM 5000 UNITS: 10000 INJECTION, SOLUTION INTRAVENOUS; SUBCUTANEOUS at 01:07

## 2022-02-14 ASSESSMENT — ACTIVITIES OF DAILY LIVING (ADL)
ADLS_ACUITY_SCORE: 12
ADLS_ACUITY_SCORE: 11
ADLS_ACUITY_SCORE: 11
ADLS_ACUITY_SCORE: 9
WERE_AUXILIARY_AIDS_OFFERED?: NO
PREVIOUS_RESPONSIBILITIES: HOUSEKEEPING;SHOPPING;MEDICATION MANAGEMENT;FINANCES;DRIVING
ADLS_ACUITY_SCORE: 11
CONCENTRATING,_REMEMBERING_OR_MAKING_DECISIONS_DIFFICULTY: YES
DIFFICULTY_COMMUNICATING: NO
ADLS_ACUITY_SCORE: 11
TOILETING_ISSUES: NO
ADLS_ACUITY_SCORE: 11
DRESSING/BATHING_DIFFICULTY: YES
ADLS_ACUITY_SCORE: 10
FALL_HISTORY_WITHIN_LAST_SIX_MONTHS: NO
ADLS_ACUITY_SCORE: 11
ADLS_ACUITY_SCORE: 11
WALKING_OR_CLIMBING_STAIRS_DIFFICULTY: NO
ADLS_ACUITY_SCORE: 11
DRESSING/BATHING: DRESSING DIFFICULTY, ASSISTANCE 1 PERSON;BATHING DIFFICULTY, ASSISTANCE 1 PERSON
ADLS_ACUITY_SCORE: 9
ADLS_ACUITY_SCORE: 10
ADLS_ACUITY_SCORE: 10
DOING_ERRANDS_INDEPENDENTLY_DIFFICULTY: NO
ADLS_ACUITY_SCORE: 11
ADLS_ACUITY_SCORE: 10
ADLS_ACUITY_SCORE: 11
ADLS_ACUITY_SCORE: 11
WHICH_OF_THE_ABOVE_FUNCTIONAL_RISKS_HAD_A_RECENT_ONSET_OR_CHANGE?: AMBULATION;BATHING;DRESSING
USE_OF_HEARING_ASSISTIVE_DEVICES: RIGHT HEARING AID;LEFT HEARING AID
DIFFICULTY_EATING/SWALLOWING: NO
ADLS_ACUITY_SCORE: 12
ADLS_ACUITY_SCORE: 11
ADLS_ACUITY_SCORE: 11
ADLS_ACUITY_SCORE: 12

## 2022-02-14 ASSESSMENT — MIFFLIN-ST. JEOR: SCORE: 1727.02

## 2022-02-14 NOTE — H&P
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    He had uncomplicated L3-L5 facetectomy and decompression done 3 days ago at [facility].  He arrives to the emergency department today concerned about urinary retention.  It sounds like this was an issue following surgery as well.  Killian catheter was placed and bladder volume was more than 800 cc.  Reviewed the case with Dr. Ferrell  who is on-call for the [facility] this weekend.  With no drainage, fever, increasing back pain, or lower extremity weakness or numbness, I do not have any suspicion that this is related to any kind of spinal cord compression or postoperative complication and the surgeon was in agreement.  I think this is postoperative urinary retention with no other evidence of complication.      Patient noted to have hypoxia, with saturations in the 88% range.  Placed on 2 L supplemental oxygen here, oxygen saturations returned to the mid 90s.  He reports that he has a history of hypoxia in the past, was previously recommended for nocturnal oxygen.    Patient normally on apixaban for afib, this was held post-op. Will need to coordinate with surgery team as to appropriate restart date.     Principal Problem:    Community acquired bilateral lower lobe pneumonia: Noted on chest CT scan bilateral lower lobes.  Started on IV ceftriaxone and IV azithromycin.  Blood cultures and sputum cultures ordered.    Acute kidney  injury with dehydration: Possible effects from urinary retention as well.  Bolused IV fluid x2 and continue IV fluid overnight.  Recheck creatinine in the a.m.  Active Problems:    Acute respiratory failure with hypoxia: Due to pneumonia and also underlying COPD.  Started on Respimat and incentive spirometry.    Postoperative urinary retention: Killian in place, start tamsulosin, consult urology for post void planning.    Diabetic peripheral neuropathy: Resume carbamazepine.  Hold gabapentin until blood pressures are more stable.    Primary hypertension: Blood pressures low to normal.  Hold outpatient BP meds for now.    Hypercholesterolemia: Resume Crestor    Lumbar stenosis with neurogenic claudication: Status post lumbar spine surgery 3 days ago.  Resume oxycodone for pain management.  Initiate scheduled Tylenol.  Continue Flexeril.    Sensorineural hearing loss, bilateral    Type 2 diabetes mellitus with complication: Blood sugars running low, will give lower dose of glargine until eating well and sugars are normalizing.  Sliding scale initiated.    Centrilobular emphysema: With hypoxia.  Started on Respimat.  As noted on chest CT    Atrial fibrillation: Was on apixaban prior to surgery.  Apixaban needs to be resumed in the next few days should coordinate this with orthopedic surgery.    Review of Systems    The 5 point Review of Systems is negative other than noted in the HPI or here.    Code Status:  Full Code      Diet: Orders Placed This Encounter      Moderate Consistent Carb (60 g CHO per Meal) Diet      DVT prophylaxis:    Medical:  subcutaneous heparin    Mechanical:  PCD's    Killian Catheter: Present  Central Lines/Port-a-cath: Not present  Drains: Not present    Disposition Plan   Expected discharge: 02/16/2022   recommended to Home once adequate pain management/ tolerating PO medications, renal function improved and Pneumonia adequately treated.    The patient's care was discussed with the  Patient.    Zac Hagan MD  [facility]  Securely message with the [employer] Web Console (learn more here)  Text page via [employer] Paging/Directory         Clinically Significant Risk Factors Present on Admission        _____________________________________________________________________    Medical History  I have reviewed this patient's medical history and updated it with pertinent information if needed.  No past medical history on file.    Surgical History   I have reviewed this patient's surgical history and updated it with pertinent information if needed.  No past surgical history on file.    Social History   I have reviewed this patient's social history and updated it with pertinent information if needed.  Social History     Tobacco Use     Smoking status: Not on file     Smokeless tobacco: Not on file   Substance Use Topics     Alcohol use: Not on file     Drug use: Not on file       Family History   I have reviewed this patient's family history and updated it with pertinent information if needed.  No family history on file.    Prior to Admission Medications   No current facility-administered medications on file prior to encounter.  acetaminophen (TYLENOL) 325 MG tablet, Take 325-650 mg by mouth every 6 hours as needed for mild pain  amLODIPine (NORVASC) 10 MG tablet, [AMLODIPINE (NORVASC) 10 MG TABLET] Take 10 mg by mouth daily.  aspirin 81 MG EC tablet, Take 81 mg by mouth every evening  atenoloL (TENORMIN) 100 MG tablet, [ATENOLOL (TENORMIN) 100 MG TABLET] Take 100 mg by mouth daily.  carBAMazepine (TEGRETOL) 200 mg tablet, [CARBAMAZEPINE (TEGRETOL) 200 MG TABLET] Take 100 mg by mouth 2 (two) times a day.  chlorthalidone (HYGROTEN) 25 MG tablet, [CHLORTHALIDONE (HYGROTEN) 25 MG TABLET] Take 25 mg by mouth daily.  cyclobenzaprine (FLEXERIL) 10 MG tablet, Take 10 mg by mouth 3 times daily as needed for muscle spasms  gabapentin (NEURONTIN) 300 MG capsule, Take 300 mg by mouth 2 times  "daily  insulin glargine (LANTUS SOLOSTAR PEN) 100 unit/mL (3 mL) pen, Inject 55 Units Subcutaneous At Bedtime   loratadine (CLARITIN) 10 MG tablet, Take 10 mg by mouth daily as needed for allergies  losartan (COZAAR) 100 MG tablet, [LOSARTAN (COZAAR) 100 MG TABLET] Take 100 mg by mouth daily.  multivitamin w/minerals (MULTI-VITAMIN) tablet, Take 1 tablet by mouth daily  oxyCODONE (ROXICODONE) 5 MG tablet, Take 5-10 mg by mouth every 4 hours as needed for severe pain  rosuvastatin (CRESTOR) 20 MG tablet, [ROSUVASTATIN (CRESTOR) 20 MG TABLET] Take 20 mg by mouth at bedtime.        Allergies    No Known Allergies    Physical Exam   Vital signs:  Temp: 97.7  F (36.5  C) Temp src: Temporal BP: 112/58 Pulse: 58   Resp: 23 SpO2: 96 % O2 Device: Nasal cannula Oxygen Delivery: 3 LPM Height: 169.5 cm (5' 6.75\") Weight: 101.6 kg (224 lb)  Estimated body mass index is 35.35 kg/m  as calculated from the following:    Height as of this encounter: 1.695 m (5' 6.75\").    Weight as of this encounter: 101.6 kg (224 lb).    Constitutional: awake, alert, cooperative, no apparent distress, and appears stated age  ENT: normocepalic, without obvious abnormality, atramatic  Respiratory: Patient coughing, reduced air movement at bases bilaterally.  Cardiovascular: Regular rate and rhythm, 1+ lower extremity edema bilaterally  GI: normal bowel sounds, soft and non-distended  Neurologic: Mental Status Exam:  Level of Alertness:   awake  Orientation:   person, place, time  Memory:   normal  Motor Exam:  moves all extremities well and symmetrically  Sensory: Reduced sensation in feet bilaterally    Data   Data reviewed today: I reviewed all medications, new labs and imaging results over the last 24 hours.  Recent Labs   Lab 02/13/22  1735 02/13/22  1451   WBC  --  10.0   HGB  --  12.3*   MCV  --  87   PLT  --  217   * 132*   POTASSIUM 3.3* 3.7   CHLORIDE 93* 92*   CO2 24 25   BUN 45* 45*   CR 2.19* 2.42*   ANIONGAP 14 15   KEO 8.1* 8.5 "   GLC 77 109     Recent Results (from the past 24 hour(s))   CT Chest Pulmonary Embolism w Contrast    Narrative    EXAM: CT CHEST PULMONARY EMBOLISM W CONTRAST  LOCATION: [facility]  DATE/TIME: 2/13/2022 6:23 PM    INDICATION: SOB.  COMPARISON: 12/01/2020 CT chest.  TECHNIQUE: CT chest pulmonary angiogram during arterial phase injection of IV contrast. Multiplanar reformats and MIP reconstructions were performed. Dose reduction techniques were used.   CONTRAST: 75 ml isovue 370.    FINDINGS:  ANGIOGRAM CHEST: Pulmonary arteries are normal caliber and negative for pulmonary emboli. Thoracic aorta is negative for dissection. No CT evidence of right heart strain.    LUNGS AND PLEURA: Mild mid and upper lung centrilobular emphysema. Mural and mucosal thickening of the bronchi with some endobronchial secretions most marked in the basilar segments of both lower lobes where there is some lung consolidation posteriorly.    MEDIASTINUM/AXILLAE: Heart size normal with no pericardial effusion. No lymphadenopathy.    CORONARY ARTERY CALCIFICATION: Mild.    UPPER ABDOMEN: Benign simple and chronic complex hepatic cysts require no follow-up.    MUSCULOSKELETAL: Bridging endplate osteophyte formation throughout the thoracic spine. No suspicious bone lesion or fracture.      Impression    IMPRESSION:  1.  No pulmonary emboli. Normal thoracic aorta.  2.  Mild mid and upper lung centrilobular emphysema.  3.  Mural and mucosal thickening of the bronchi with some endobronchial secretions most marked in the basilar segments of both lower lobes where there is some lung consolidation posteriorly compatible with developing bronchopneumonia.

## 2022-02-14 NOTE — PROGRESS NOTES
"Orthopedic Progress Note      Assessment:    S/p lumbar spinal surgery 2/11     Plan:   - Continue PT/OT  - Weightbearing status: WBAT  - Anticoagulation: MAY RESUME ELIQUIS TOMORROW 2/15     Subjective:  Pain: tolerable pain across low back, denies pain, numbness, tingling, weakness down legs. Denies foot drop with walking. Continues to be worked up for urinary retention. Patient reports he has had this happen in the past after a surgery.     Objective:  /57 (BP Location: Right arm)   Pulse 67   Temp 98  F (36.7  C) (Oral)   Resp 16   Ht 1.695 m (5' 6.75\")   Wt 104.7 kg (230 lb 14.4 oz)   SpO2 96%   BMI 36.44 kg/m    The patient is A&Ox3. Appears comfortable.   BLE sensation intact.   Dorsiflexion and plantar flexion is intact. 5/5 motor strength.   Pain with leg lifts off bed so limited assessment.   Dorsalis pedis pulse intact.  Calves are soft and non-tender. Negative Damir's.  The incision steri strips in place, no drainage, no erythema, no ecchymosis. Healing well.       Pertinent Labs   Lab Results: personally reviewed.   Lab Results   Component Value Date    INR 1.19 (H) 12/06/2020    INR 1.18 (H) 12/05/2020    INR 1.10 12/04/2020     Lab Results   Component Value Date    WBC 10.0 02/13/2022    HGB 12.3 (L) 02/13/2022    HCT 34.5 (L) 02/13/2022    MCV 87 02/13/2022     02/13/2022     Lab Results   Component Value Date     (L) 02/14/2022    CO2 27 02/14/2022         Report completed by:  Aniyah Hunt PA-C, VÍCTOR  Date: 2/14/2022  Time: 4:07 PM    "

## 2022-02-14 NOTE — CONSULTS
"Care Management Initial Consult    General Information  Assessment completed with: Spouse or significant other, Annie wife via phone.  Type of CM/SW Visit: Initial Assessment    Primary Care Provider verified and updated as needed: Yes   Readmission within the last 30 days: no previous admission in last 30 days (\"surgery at [facility] on 2/10/22, not admitted\")      Reason for Consult: discharge planning  Advance Care Planning: Advance Care Planning Reviewed: other (comment) (\"doesn't have one\")          Communication Assessment  Patient's communication style: spoken language (English or Bilingual)    Hearing Difficulty or Deaf: yes   Wear Glasses or Blind: yes    Cognitive  Cognitive/Neuro/Behavioral: WDL                      Living Environment:   People in home: spouse     Current living Arrangements: house (\"a few steps to get inside, can live on one level\")      Able to return to prior arrangements: yes       Family/Social Support:  Care provided by: self,spouse/significant other  Provides care for: no one  Marital Status:   Wife  Annie       Description of Support System: Supportive,Involved    Support Assessment: Adequate family and caregiver support,Adequate social supports,Patient communicates needs well met    Current Resources:   Patient receiving home care services: No     Community Resources: None  Equipment currently used at home: walker, rolling,glucometer (4ww)  Supplies currently used at home: Diabetic Supplies,Hearing Aid Batteries,Other (\"hearing aids and , glasses\")    Employment/Financial:  Employment Status: retired     Employment/ Comments: \"no  benefits\"  Financial Concerns:     Referral to Financial Counselor: No       Lifestyle & Psychosocial Needs:  Social Determinants of Health     Tobacco Use: Not on file   Alcohol Use: Not on file   Financial Resource Strain: Not on file   Food Insecurity: Not on file   Transportation Needs: Not on file   Physical Activity: " "Not on file   Stress: Not on file   Social Connections: Not on file   Intimate Partner Violence: Not on file   Depression: Not on file   Housing Stability: Not on file       Functional Status:  Prior to admission patient needed assistance:   Dependent ADLs:: Ambulation-walker,Independent  Dependent IADLs:: Independent  Assesssment of Functional Status: Not at baseline with ADL Functioning,Not at baseline with mobility,Not at  functional baseline    Mental Health Status:          Chemical Dependency Status:                Values/Beliefs:  Spiritual, Cultural Beliefs, [employer] Practices, Values that affect care:                 Additional Information:  Aristeo lives in a house with his wife. He is able to \"use a few steps to get inside then all 1 level if needed\".    He is independent with most ADLs, but wife has been helping more after his outpatient back surgery on 2/10/22.    He uses a 4 wheeled walker for mobility. He has an oximeter at home. He was \"told he might need home oxygen, but that was when he had COVID back a while ago. Ever since then his oxygen has been fine when we check it\".    Unknown discharge needs at this time. He may have to go home with an indwelling rosas catheter.    The plan after surgery \"was for him to follow up with the Ortho doctor in March and at that time they would decide when he could start doing Outpatient PT and OT\".    Wife to transport at discharge.    Yolie Henrández RN      "

## 2022-02-14 NOTE — PROGRESS NOTES
02/14/22 1100   Quick Adds   Type of Visit Initial Occupational Therapy Evaluation   Living Environment   People in home spouse   Current Living Arrangements house   Home Accessibility no concerns   Transportation Anticipated car, drives self  (when off pain medication)   Living Environment Comments pt reports he can stay on main level of house   Self-Care   Usual Activity Tolerance good   Current Activity Tolerance poor   Equipment Currently Used at Home raised toilet seat;shower chair;walker, rolling   Activity/Exercise/Self-Care Comment pt reports IND prior to surgery, has been sleeping in a recliner chair since surgery and not moving much   Instrumental Activities of Daily Living (IADL)   Previous Responsibilities housekeeping;shopping;medication management;finances;driving   IADL Comments I with AD   Disability/Function   Hearing Difficulty or Deaf yes  ([place])   Change in Functional Status Since Onset of Current Illness/Injury yes   General Information   Onset of Illness/Injury or Date of Surgery 02/13/22   Referring Physician Zac Hagan MD   Patient/Family Therapy Goal Statement (OT) i want to walk and take care of myself   Additional Occupational Profile Info/Pertinent History of Current Problem low complexity, PMH: L3-5 facetomy and decompression 2/10 at [facility], urinary retention (now with rosas), DMII, dyspnea (on 2L 93%), A-fib, MVR   Existing Precautions/Restrictions fall;spinal;oxygen therapy device and L/min   Heart Disease Risk Factors Medical history   Cognitive Status Examination   Orientation Status orientation to person, place and time   Affect/Mental Status (Cognitive) WFL   Follows Commands WFL   Range of Motion Comprehensive   General Range of Motion no range of motion deficits identified   Strength Comprehensive (MMT)   General Manual Muscle Testing (MMT) Assessment no strength deficits identified   Lower Body Dressing Assessment   [place] Level (Lower Body Dressing) moderate assist  (50% patient effort)   Position (Lower Body Dressing) [place] sitting   Clinical Impression   Criteria for Skilled Therapeutic Interventions Met (OT) yes;skilled treatment is necessary   OT Diagnosis impaired ADls   OT Problem List-Impairments impacting ADL problems related to;activity tolerance impaired;balance;mobility;strength;pain;post-surgical precautions   ADL comments/analysis pt currently requries min- mod A for basic self cares   Assessment of Occupational Performance 1-3 Performance Deficits   Identified Performance Deficits dressing, toileting, functional transfers   Planned Therapy Interventions (OT) ADL retraining;IADL retraining;balance training;bed mobility training;strengthening   Clinical Decision Making Complexity (OT) low complexity   Therapy Frequency (OT) Daily   Predicted Duration of Therapy 1 week   Anticipated Equipment Needs Upon Discharge (OT) gait belt;shower chair   Risk & Benefits of therapy have been explained evaluation/treatment results reviewed;care plan/treatment goals reviewed;risks/benefits reviewed;current/potential barriers reviewed;participants voiced agreement with care plan;participants included;patient   Comment-Clinical Impression OT assessment completed with deficits noted in strength. stamina, pain in back at surgical site limiting I with self cares and functional mobility   OT Discharge Planning    OT Discharge Recommendation (DC Rec) Home with assist   OT Rationale for DC Rec pt reports his wife will assist with home mgmt tasks, anticipate progess with ADLs   Total Evaluation Time (Minutes)   Total Evaluation Time (Minutes) 10

## 2022-02-14 NOTE — PROGRESS NOTES
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, presents with urinary retention and hypoxia    Community acquired bilateral lower lobe pneumonia:   - Noted on chest CT scan bilateral lower lobes.    - Started on IV ceftriaxone and IV azithromycin.    - IS, f/u BC, sputum culture      Acute kidney injury with dehydration:   -Possible effects from urinary retention as well.   -Creatinine down trending  -Keep Killian catheter in place      Acute respiratory failure with hypoxia:   - Due to pneumonia and also underlying COPD.    - Started on Respimat and incentive spirometry.      Postoperative urinary retention:   -Killian in place, start tamsulosin, consult urology for post void planning.      Diabetic peripheral neuropathy:   -Resume carbamazepine.    - Hold gabapentin until blood pressures are more stable.      Primary hypertension:   - Blood pressures low to normal.  Hold outpatient BP meds for now.      Hypercholesterolemia:   - Resume Crestor      Lumbar stenosis with neurogenic claudication:   - Status post lumbar spine surgery 3 days prior to admission.  Resume oxycodone for pain management.  Initiate scheduled Tylenol.  Continue Flexeril.      Sensorineural hearing loss, bilateral      Type 2 diabetes mellitus with complication:   - Blood sugars running low,   -Lantus 10 units subcutaneous once daily  -medium dose insulin sliding scale  -Accu-Cheks  -Hypoglycemia protocol      Centrilobular emphysema: With hypoxia.  Started on Respimat.  As noted on chest CT      Atrial fibrillation: Was on apixaban prior to surgery.  Apixaban needs to be resumed in the next few days should coordinate this with orthopedic surgery.    DVT PPX : heparin    Barriers to  discharge: Back Pain, hypoxia    Anticipated Discharge date  : 1-2 days    Subjective  Patient states he is having severe back pain.  Rating 7-8 out of 10.  No distress noted.  On 2 L oxygen via nasal cannula.    Objective  Vital signs in last 24 hours  Temp:  [97.8  F (36.6  C)-98  F (36.7  C)] 98  F (36.7  C)  Pulse:  [54-68] 67  Resp:  [8-34] 16  BP: ()/(50-63) 118/57  SpO2:  [90 %-98 %] 96 %    Input and Output in 24 hrs     Intake/Output Summary (Last 24 hours) at 2/14/2022 1605  Last data filed at 2/14/2022 1400  Gross per 24 hour   Intake 1000 ml   Output 2550 ml   Net -1550 ml       Physical Exam:  GEN: Alert and oriented. Not in acute distress  HEENT: Atraumatic    Pupils- round and reactive to light bilaterally   Neck- supple, no JVP elevation, no lymphadenopathy or thyromegaly   Sclera- anicteric   Mucous membrane- moist and pink  CHEST: Clear to auscultation bilaterally  HEART: S1S2 regular. No murmurs, rubs or gallops  ABDOMEN: Soft. Non-tender, non-distended. No organomegaly. No guarding or rigidity. Bowel sounds- active, rosas catheter in place  Extremities: No pedal oedema  CNS: No focal neurological deficit. No involuntary movements  SKIN: No skin rash, no cyanosis or clubbing      Pertinent Labs   Lab Results: Personally Reviewed    Recent Results (from the past 24 hour(s))   Basic metabolic panel    Collection Time: 02/13/22  5:35 PM   Result Value Ref Range    Sodium 131 (L) 136 - 145 mmol/L    Potassium 3.3 (L) 3.5 - 5.0 mmol/L    Chloride 93 (L) 98 - 107 mmol/L    Carbon Dioxide (CO2) 24 22 - 31 mmol/L    Anion Gap 14 5 - 18 mmol/L    Urea Nitrogen 45 (H) 8 - 28 mg/dL    Creatinine 2.19 (H) 0.70 - 1.30 mg/dL    Calcium 8.1 (L) 8.5 - 10.5 mg/dL    Glucose 77 70 - 125 mg/dL    GFR Estimate 30 (L) >60 mL/min/1.73m2   Asymptomatic COVID-19 Virus (Coronavirus) by PCR Nasopharyngeal    Collection Time: 02/13/22 10:10 PM    Specimen: Nasopharyngeal; Swab   Result Value Ref Range    SARS CoV2 PCR  Negative Negative   Glucose by meter    Collection Time: 02/14/22  1:14 AM   Result Value Ref Range    GLUCOSE BY METER POCT 97 70 - 99 mg/dL   Basic metabolic panel    Collection Time: 02/14/22  6:41 AM   Result Value Ref Range    Sodium 134 (L) 136 - 145 mmol/L    Potassium 3.5 3.5 - 5.0 mmol/L    Chloride 99 98 - 107 mmol/L    Carbon Dioxide (CO2) 27 22 - 31 mmol/L    Anion Gap 8 5 - 18 mmol/L    Urea Nitrogen 38 (H) 8 - 28 mg/dL    Creatinine 1.25 0.70 - 1.30 mg/dL    Calcium 7.7 (L) 8.5 - 10.5 mg/dL    Glucose 105 70 - 125 mg/dL    GFR Estimate 59 (L) >60 mL/min/1.73m2   Glucose by meter    Collection Time: 02/14/22  7:58 AM   Result Value Ref Range    GLUCOSE BY METER POCT 103 (H) 70 - 99 mg/dL   Glucose by meter    Collection Time: 02/14/22 11:37 AM   Result Value Ref Range    GLUCOSE BY METER POCT 129 (H) 70 - 99 mg/dL       Pertinent Radiology   Radiology Results reviewed      EKG Results reviewed       Advanced Care Planning      Haroldo Camacho MD   [facility]

## 2022-02-14 NOTE — ED NOTES
"Pt repositioned in bed for comfort. Pt states \"my tailbone is really hurting from laying so much\". Visualized back side, no signs of redness or skin breakdown, skin is clean and dry. Incision dressing along spine is clean, dry, and intact. Increased pain with repositioning, medication given (see MAR).   "

## 2022-02-14 NOTE — ED NOTES
"[facility] ED Handoff Report    ED Chief Complaint: unable to void after surgery    ED Diagnosis:  (N99.89,  R33.8) Postoperative urinary retention  Comment:   Plan: Rosas cath placed 2/13--draining clear urine    (R09.02) Hypoxia  Comment: On oxygen at 2 liters NC--does desat to high 80's on room air--pneumonia--on antibiotics  Plan:        PMH:  No past medical history on file.     Code Status:  Full Code     Falls Risk: Yes Band: Applied    Current Living Situation/Residence: lives with a significant other     Elimination Status: Continent: Yes     Activity Level: SBA    Patients Preferred Language:  English     Needed: No    Vital Signs:  /55   Pulse 58   Temp 97.8  F (36.6  C) (Oral)   Resp 17   Ht 1.695 m (5' 6.75\")   Wt 101.6 kg (224 lb)   SpO2 94%   BMI 35.35 kg/m       Cardiac Rhythm: SR    Pain Score: 3/10    Is the Patient Confused:  No    Last Food or Drink: 02/14/22 at 1400    Focused Assessment:  Pt had back surgery a week ago.  Is having continued back pain with movement--scheduled tylenol and flexeril--morphine and oxy prn. Reluctant to take narcs.  Has PT/OT getting pt to side of bed and standing.  Has rosas in place.  On oxygen--pneumonia--Covid negative. No resp distress.  Alert and orient X 3.  Eating well--accuchecks have been good--no coverage needed.     Tests Performed: Done: Labs and Imaging    Treatments Provided:      Family Dynamics/Concerns: No    Family Updated On Visitor Policy: Yes    Plan of Care Communicated to Family: Yes    Who Was Updated about Plan of Care: wife    Belongings Checklist Done and Signed by Patient: Yes    Medications sent with patient: none    Covid: symptomatic, negative    Additional Information:     RN: Aimee Brewster   2/14/2022 2:26 PM         "

## 2022-02-14 NOTE — ED NOTES
"Nursing assessment--    Pt is alert and oriented  X 3.  Was having back pain this am, having difficulty moving. Pt is reluctant to take narcotics, encouraged him to get his pain under control.  After morphine pt states the pain is better and was able to tolerate repositioning in bed.  Ate his breakfast and scheduled meds given  Did not want his inhaler, he says \"I have not used that for years\".  Did take his oxygen off to see the results.  His O2 sats did drop to 88%, was placed back on 2 liters.  Matt any SOB. Wife called and updated her.  All questions and concerns addressed.  Pts rosas cath draining well, clear urine.   "

## 2022-02-15 ENCOUNTER — APPOINTMENT (OUTPATIENT)
Dept: OCCUPATIONAL THERAPY | Facility: HOSPITAL | Age: 77
DRG: 193 | End: 2022-02-15
Payer: COMMERCIAL

## 2022-02-15 ENCOUNTER — APPOINTMENT (OUTPATIENT)
Dept: PHYSICAL THERAPY | Facility: HOSPITAL | Age: 77
DRG: 193 | End: 2022-02-15
Attending: INTERNAL MEDICINE
Payer: COMMERCIAL

## 2022-02-15 LAB
ANION GAP SERPL CALCULATED.3IONS-SCNC: 8 MMOL/L (ref 5–18)
BUN SERPL-MCNC: 25 MG/DL (ref 8–28)
CALCIUM SERPL-MCNC: 8.3 MG/DL (ref 8.5–10.5)
CALCIUM, IONIZED MEASURED: 1.15 MMOL/L (ref 1.11–1.3)
CHLORIDE BLD-SCNC: 101 MMOL/L (ref 98–107)
CO2 SERPL-SCNC: 29 MMOL/L (ref 22–31)
CREAT SERPL-MCNC: 0.82 MG/DL (ref 0.7–1.3)
ERYTHROCYTE [DISTWIDTH] IN BLOOD BY AUTOMATED COUNT: 12.5 % (ref 10–15)
GFR SERPL CREATININE-BSD FRML MDRD: 90 ML/MIN/1.73M2
GLUCOSE BLD-MCNC: 125 MG/DL (ref 70–125)
GLUCOSE BLDC GLUCOMTR-MCNC: 113 MG/DL (ref 70–99)
GLUCOSE BLDC GLUCOMTR-MCNC: 115 MG/DL (ref 70–99)
GLUCOSE BLDC GLUCOMTR-MCNC: 130 MG/DL (ref 70–99)
GLUCOSE BLDC GLUCOMTR-MCNC: 152 MG/DL (ref 70–99)
GLUCOSE BLDC GLUCOMTR-MCNC: 99 MG/DL (ref 70–99)
HCT VFR BLD AUTO: 32 % (ref 40–53)
HGB BLD-MCNC: 11.2 G/DL (ref 13.3–17.7)
ION CA PH 7.4: 1.11 MMOL/L (ref 1.11–1.3)
MCH RBC QN AUTO: 30.8 PG (ref 26.5–33)
MCHC RBC AUTO-ENTMCNC: 35 G/DL (ref 31.5–36.5)
MCV RBC AUTO: 88 FL (ref 78–100)
PH: 7.34 (ref 7.35–7.45)
PLATELET # BLD AUTO: 204 10E3/UL (ref 150–450)
POTASSIUM BLD-SCNC: 3.6 MMOL/L (ref 3.5–5)
RBC # BLD AUTO: 3.64 10E6/UL (ref 4.4–5.9)
SODIUM SERPL-SCNC: 138 MMOL/L (ref 136–145)
WBC # BLD AUTO: 5.2 10E3/UL (ref 4–11)

## 2022-02-15 PROCEDURE — 85027 COMPLETE CBC AUTOMATED: CPT | Performed by: STUDENT IN AN ORGANIZED HEALTH CARE EDUCATION/TRAINING PROGRAM

## 2022-02-15 PROCEDURE — 82374 ASSAY BLOOD CARBON DIOXIDE: CPT | Performed by: STUDENT IN AN ORGANIZED HEALTH CARE EDUCATION/TRAINING PROGRAM

## 2022-02-15 PROCEDURE — 250N000013 HC RX MED GY IP 250 OP 250 PS 637: Performed by: INTERNAL MEDICINE

## 2022-02-15 PROCEDURE — 82330 ASSAY OF CALCIUM: CPT | Performed by: STUDENT IN AN ORGANIZED HEALTH CARE EDUCATION/TRAINING PROGRAM

## 2022-02-15 PROCEDURE — 99233 SBSQ HOSP IP/OBS HIGH 50: CPT | Performed by: STUDENT IN AN ORGANIZED HEALTH CARE EDUCATION/TRAINING PROGRAM

## 2022-02-15 PROCEDURE — 250N000011 HC RX IP 250 OP 636: Performed by: INTERNAL MEDICINE

## 2022-02-15 PROCEDURE — 82310 ASSAY OF CALCIUM: CPT | Performed by: STUDENT IN AN ORGANIZED HEALTH CARE EDUCATION/TRAINING PROGRAM

## 2022-02-15 PROCEDURE — 36415 COLL VENOUS BLD VENIPUNCTURE: CPT | Performed by: STUDENT IN AN ORGANIZED HEALTH CARE EDUCATION/TRAINING PROGRAM

## 2022-02-15 PROCEDURE — 120N000001 HC R&B MED SURG/OB

## 2022-02-15 PROCEDURE — 97535 SELF CARE MNGMENT TRAINING: CPT | Mod: GO

## 2022-02-15 PROCEDURE — 97116 GAIT TRAINING THERAPY: CPT | Mod: GP

## 2022-02-15 PROCEDURE — 258N000003 HC RX IP 258 OP 636: Performed by: INTERNAL MEDICINE

## 2022-02-15 PROCEDURE — 250N000013 HC RX MED GY IP 250 OP 250 PS 637: Performed by: STUDENT IN AN ORGANIZED HEALTH CARE EDUCATION/TRAINING PROGRAM

## 2022-02-15 PROCEDURE — 97161 PT EVAL LOW COMPLEX 20 MIN: CPT | Mod: GP

## 2022-02-15 RX ORDER — AMOXICILLIN 250 MG
2 CAPSULE ORAL AT BEDTIME
Status: DISCONTINUED | OUTPATIENT
Start: 2022-02-15 | End: 2022-02-16 | Stop reason: HOSPADM

## 2022-02-15 RX ADMIN — CYCLOBENZAPRINE 10 MG: 10 TABLET, FILM COATED ORAL at 08:20

## 2022-02-15 RX ADMIN — HEPARIN SODIUM 5000 UNITS: 10000 INJECTION, SOLUTION INTRAVENOUS; SUBCUTANEOUS at 12:00

## 2022-02-15 RX ADMIN — CEFTRIAXONE SODIUM 1 G: 1 INJECTION, POWDER, FOR SOLUTION INTRAMUSCULAR; INTRAVENOUS at 22:47

## 2022-02-15 RX ADMIN — DOCUSATE SODIUM 50 MG AND SENNOSIDES 8.6 MG 2 TABLET: 8.6; 5 TABLET, FILM COATED ORAL at 20:54

## 2022-02-15 RX ADMIN — CYCLOBENZAPRINE 10 MG: 10 TABLET, FILM COATED ORAL at 14:18

## 2022-02-15 RX ADMIN — CARBAMAZEPINE 100 MG: 200 TABLET ORAL at 20:55

## 2022-02-15 RX ADMIN — AMLODIPINE BESYLATE 10 MG: 5 TABLET ORAL at 08:19

## 2022-02-15 RX ADMIN — TAMSULOSIN HYDROCHLORIDE 0.4 MG: 0.4 CAPSULE ORAL at 08:20

## 2022-02-15 RX ADMIN — APIXABAN 5 MG: 5 TABLET, FILM COATED ORAL at 20:55

## 2022-02-15 RX ADMIN — HEPARIN SODIUM 5000 UNITS: 10000 INJECTION, SOLUTION INTRAVENOUS; SUBCUTANEOUS at 00:01

## 2022-02-15 RX ADMIN — INSULIN GLARGINE 10 UNITS: 100 INJECTION, SOLUTION SUBCUTANEOUS at 21:41

## 2022-02-15 RX ADMIN — ACETAMINOPHEN 975 MG: 325 TABLET ORAL at 20:55

## 2022-02-15 RX ADMIN — CYCLOBENZAPRINE 10 MG: 10 TABLET, FILM COATED ORAL at 20:54

## 2022-02-15 RX ADMIN — ACETAMINOPHEN 975 MG: 325 TABLET ORAL at 09:32

## 2022-02-15 RX ADMIN — CARBAMAZEPINE 100 MG: 200 TABLET ORAL at 08:19

## 2022-02-15 RX ADMIN — ROSUVASTATIN CALCIUM 20 MG: 10 TABLET, FILM COATED ORAL at 21:41

## 2022-02-15 RX ADMIN — ACETAMINOPHEN 975 MG: 325 TABLET ORAL at 14:18

## 2022-02-15 RX ADMIN — OXYCODONE HYDROCHLORIDE AND ACETAMINOPHEN 1 TABLET: 5; 325 TABLET ORAL at 08:13

## 2022-02-15 RX ADMIN — ASPIRIN 81 MG: 81 TABLET, COATED ORAL at 20:55

## 2022-02-15 RX ADMIN — AZITHROMYCIN MONOHYDRATE 500 MG: 500 INJECTION, POWDER, LYOPHILIZED, FOR SOLUTION INTRAVENOUS at 20:58

## 2022-02-15 RX ADMIN — INSULIN ASPART 1 UNITS: 100 INJECTION, SOLUTION INTRAVENOUS; SUBCUTANEOUS at 11:55

## 2022-02-15 ASSESSMENT — ACTIVITIES OF DAILY LIVING (ADL)
ADLS_ACUITY_SCORE: 15
ADLS_ACUITY_SCORE: 13
ADLS_ACUITY_SCORE: 15
ADLS_ACUITY_SCORE: 15
ADLS_ACUITY_SCORE: 13
ADLS_ACUITY_SCORE: 15
ADLS_ACUITY_SCORE: 13

## 2022-02-15 NOTE — PROGRESS NOTES
02/15/22 0840   Quick Adds   Type of Visit Initial PT Evaluation       Present no   Living Environment   People in home spouse   Current Living Arrangements house  (can live on 1 level)   Self-Care   Equipment Currently Used at Home walker, rolling;other (see comments);cane, straight  (4WW)   Disability/Function   Use of hearing assistive devices bilateral hearing aids   General Information   Onset of Illness/Injury or Date of Surgery 02/13/22   Referring Physician Dr. Haroldo Camacho   Patient/Family Therapy Goals Statement (PT) return home    Pertinent History of Current Problem (include personal factors and/or comorbidities that impact the POC) admit CAP, 2/10 s/p L3-5 facetectomy/decompression    Existing Precautions/Restrictions other (see comments)  (O2- 2L)   General Observations pt up in chair    Cognition   Orientation Status (Cognition) oriented x 4   Pain Assessment   Patient Currently in Pain Yes, see Vital Sign flowsheet  (LBP better after walking)   Range of Motion (ROM)   ROM Comment BLEs WFL    Strength   Strength Comments B LE WFl for ambulation    Bed Mobility   Comment (Bed Mobility) up in recliner at start/end of PT session    Transfers   Impairments Contributing to Impaired Transfers pain   Transfer Safety Comments SBA/ CGA increased time and effort due to pain    Gait/Stairs (Locomotion)   [place] Level (Gait) supervision   Assistive Device (Gait) walker, rolling platform   Distance in Feet (Required for LE Total Joints) 250'   Pattern (Gait) step-through   Deviations/Abnormal Patterns (Gait) antalgic;gait speed decreased   Maintains Weight-bearing Status (Gait) able to maintain   Comment (Gait/Stairs) SBA and 2L -O2    Balance   Balance Comments no LOB   Clinical Impression   Criteria for Skilled Therapeutic Intervention yes, treatment indicated   PT Diagnosis (PT) decreased mobility    Influenced by the following impairments pain, generalized weakness after  surgery   Functional limitations due to impairments decreased independent ambulation    Clinical Presentation Stable/Uncomplicated   Clinical Presentation Rationale per medical diagnosis   Clinical Decision Making (Complexity) low complexity   Therapy Frequency (PT) Daily   Predicted Duration of Therapy Intervention (days/wks) 5   Planned Therapy Interventions (PT) gait training;stair training;progressive activity/exercise   Anticipated Equipment Needs at Discharge (PT)   (pt has FWW and 4WW at home )   Risk & Benefits of therapy have been explained evaluation/treatment results reviewed   PT Discharge Planning    PT Discharge Recommendation (DC Rec) home with assist   PT Rationale for DC Rec pt SBA/CGA for mobility   Total Evaluation Time   Total Evaluation Time (Minutes) 10

## 2022-02-15 NOTE — CONSULTS
[facility] CONSULT - URINARY RETENTION     Type of Consult: inpatient   Place of Service:  [facility]   Reason for Consultation: Urinary retention   Consult Requested by: Dr. Hagan    History of present illness:  Aristeo Henry is a 76 year old male with history of type 2 diabetes mellitus, diabetic neuropathy, hypertension, atrial fibrillation (on apixaban), hard of hearing, COPD, dyslipidemia, status post lumbar spine surgery (L3-L5 facetectomy and decompression) 2/10/22 at [facility], history of bladder cancer and BPH; admitted to the ER on 2/13/2022 with postop urinary retention and hypoxia. Urology was consulted for urinary retention. History is obtained from patient and chart review.     Patient reports he was unable to urinate on his own more than a few drops after hospital discharge 2/10/22 through ED admit 2/13. Noted associated abdominal discomfort. Killian catheter was placed in the ER with initial volume of greater than 800 mL. Denied any extremity weakness or numbness at that time.  UA 2/13/2022 not concerning for infection. Creatinine on 2/13/2022 was elevated at 2.42, 0.85 on 2/7/2022. Did receive a dose of IV Toradol on 2/14.    No recent imaging. CT abdomen pelvis stone protocol 3/13/2019 showed moderate prostatic hypertrophy and bladder diverticulum. Follows with Dr. Duckworth for hx of BPH and bladder cancer, last visit 4/21/21 with normal cystoscopy and BPH symptoms reported stable. Prior to this admission patient was not taking medication for enlarged prostate. Reports chronic difficulty starting urine stream and urinary frequency. Wakes 1-2 times per night to void. Flomax 0.4 mg p.o. daily was initiated on 2/14/2022.     No hx of recurrent UTIs.     Is taking narcotic pain medication which may contribute to urinary retention.    Last bowel movement was 2/10/22.       Past medical history :  Type 2 diabetes mellitus, diabetic neuropathy, hypertension, atrial  fibrillation (on apixaban), hard of hearing, COPD, dyslipidemia, history of bladder cancer    Past surgical history:   Status post lumbar spine surgery (L3-L5 facetectomy and decompression) 2/10/22 at [facility]    Social history:  Social History     Socioeconomic History     Marital status:      Spouse name: Not on file     Number of children: Not on file     Years of education: Not on file     Highest education level: Not on file   Occupational History     Not on file   Tobacco Use     Smoking status: Not on file     Smokeless tobacco: Not on file   Substance and Sexual Activity     Alcohol use: Not on file     Drug use: Not on file     Sexual activity: Not on file   Other Topics Concern     Not on file   Social History Narrative     Not on file     Social Determinants of Health     Financial Resource Strain: Not on file   Food Insecurity: Not on file   Transportation Needs: Not on file   Physical Activity: Not on file   Stress: Not on file   Social Connections: Not on file   Intimate Partner Violence: Not on file   Housing Stability: Not on file       Medications  Current Facility-Administered Medications   Medication     acetaminophen (TYLENOL) tablet 975 mg     amLODIPine (NORVASC) tablet 10 mg     apixaban ANTICOAGULANT (ELIQUIS) tablet 5 mg     aspirin EC tablet 81 mg     [Held by provider] atenolol (TENORMIN) tablet 100 mg     azithromycin (ZITHROMAX) 500 mg in sodium chloride 0.9 % 250 mL intermittent infusion     carBAMazepine (TEGretol) half-tab 100 mg     cefTRIAXone (ROCEPHIN) 1 g vial to attach to  mL bag for ADULTS or NS 50 mL bag for PEDS     cyclobenzaprine (FLEXERIL) tablet 10 mg     cyclobenzaprine (FLEXERIL) tablet 10 mg     glucose gel 15-30 g    Or     dextrose 50 % injection 25-50 mL    Or     glucagon injection 1 mg     [Held by provider] gabapentin (NEURONTIN) capsule 300 mg     heparin ANTICOAGULANT injection 5,000 Units     insulin aspart (NovoLOG) injection  "(RAPID ACTING)     insulin aspart (NovoLOG) injection (RAPID ACTING)     insulin glargine (LANTUS PEN) injection 10 Units     lidocaine (LMX4) cream     lidocaine 1 % 0.1-1 mL     loratadine (CLARITIN) tablet 10 mg     [Held by provider] losartan (COZAAR) tablet 100 mg     melatonin tablet 1 mg     morphine (PF) injection 4 mg     naloxone (NARCAN) injection 0.2 mg    Or     naloxone (NARCAN) injection 0.4 mg    Or     naloxone (NARCAN) injection 0.2 mg    Or     naloxone (NARCAN) injection 0.4 mg     ondansetron (ZOFRAN-ODT) ODT tab 4 mg    Or     ondansetron (ZOFRAN) injection 4 mg     oxyCODONE-acetaminophen (PERCOCET) 5-325 MG per tablet 1 tablet     rosuvastatin (CRESTOR) tablet 20 mg     senna-docusate (SENOKOT-S/PERICOLACE) 8.6-50 MG per tablet 1 tablet    Or     senna-docusate (SENOKOT-S/PERICOLACE) 8.6-50 MG per tablet 2 tablet     sodium chloride (PF) 0.9% PF flush 3 mL     sodium chloride (PF) 0.9% PF flush 3 mL     tamsulosin (FLOMAX) capsule 0.4 mg     umeclidinium (INCRUSE ELLIPTA) 62.5 MCG/INH inhaler 1 puff       Allergies  No Known Allergies    Review of systems   12 point review of systems is otherwise negative except what is stated in the HPI.     Physical examinations:  /57   Pulse 70   Temp 98.1  F (36.7  C)   Resp 20   Ht 1.695 m (5' 6.75\")   Wt 104.7 kg (230 lb 14.4 oz)   SpO2 94%   BMI 36.44 kg/m     GENERAL: NAD, alert, cooperative  HEAD: normocephalic/atraumatic   ABDOMEN: soft, non- tender, non- distended, no suprapubic fulness/tenderness noted, no bilateral CVA tenderness noted. : Penis and scrotum without erythema or edema. Killian draining clear jordan urine.   SKIN: no rashes or lesions over abdomen and groin.   MUSCULOSKELETAL: moves all four extremities equally  PSYCHOLOGICAL: alert and oriented, answers questions appropriately      LABS:   Lab Results   Component Value Date    WBC 5.2 02/15/2022    HGB 11.2 (L) 02/15/2022    HCT 32.0 (L) 02/15/2022     02/15/2022 "    ALT 33 12/02/2020    AST 33 12/02/2020     02/15/2022    BUN 25 02/15/2022    CO2 29 02/15/2022    INR 1.19 (H) 12/06/2020       UA RESULTS:  Recent Labs   Lab Test 02/13/22  1343   COLOR Yellow   APPEARANCE Clear   URINEGLC Negative   URINEBILI Negative   URINEKETONE Negative   SG 1.017   UBLD Negative   URINEPH 5.0   PROTEIN 10 *   NITRITE Negative   LEUKEST Negative   RBCU <1   WBCU <1       Urine culture: None    I have personally reviewed these labs.     IMAGING:  EXAM: CT ABDOMEN PELVIS STONE PROTOCOL WO   LOCATION: [facility]   DATE/TIME: 3/13/2019 1:44 PM     INDICATION: Malignant Neoplasm Of Lateral Wall Of Bladder (hc)   COMPARISON: 3/12/15   TECHNIQUE: Helical thin-section CT scan of the abdomen and pelvis was performed   without oral or IV contrast. Multiplanar reformats were obtained. Dose reduction   techniques were used.   CONTRAST: None.     FINDINGS:   LUNG BASES: Negative.     ABDOMEN: A few cysts scattered throughout the liver the largest in segment 8 has   slightly increased density but is smaller in caliber to 3/12/2015 now measures   2.7 cm. Likely some hemorrhagic debris within this.     Normal spleen, pancreas, and adrenal glands. Couple tiny cysts in the left   kidney unchanged. No renal stones. No hydronephrosis.     PELVIS: Moderate prostatic hypertrophy. Bladder diverticulum. Similar to   previous. No new findings. No pelvic lymphadenopathy.     MUSCULOSKELETAL: Negative.     CONCLUSION:   1.  Nothing concerning for recurrent tumor in the abdomen or pelvis without use   of IV contrast.     2.  Moderate prostatic hypertrophy with bladder diverticulum similar to   previous.     3.  Cysts in the liver the largest slightly smaller than the older study. This   should be of little significance.      I have personally reviewed the imaging reports above.     ASSESSMENT/PLAN:  Aristeo Henry is being seen by [facility] for post-operative urinary  retention.     - Rosas catheter placed 2/13/22 with 800 ml initial return.   - UA 2/13 benign for infection.   - MOHINDER resolved after rosas placed, creat 0.82 today.   - Hx of BPH with LUTS. Flomax 0.4 mg po daily initiated 2/13/22 - continue at discharge.   - Recommend trying to decrease or stop medication that may be contributing to retention (narcotic pain medication).   - Ensure the patient is on a bowel regimen, last BM was 2/10/22.    - Recommend maintaining catheter for 1-2 weeks, allowing time for Flomax to work, resolution of constipation and for patient to wean off Percocet. Email sent to arrange trial void as an outpatient and [facility] contact info placed in discharge instructions.   - Old records reviewed - [place], [facility], [facility]    The patient and I discussed the signs/ symptoms and etiologies of urinary retention. We also discussed treatment options and their alternatives, including the risks and benefits of each. We discussed the importance of treatment and that left untreated urinary retention can lead to infection, leaking, renal dysfunction and bladder rupture. Patient demonstrated understanding. All questions and concerns were answered in detail.    Will update:  Dr Storm Duckworth    Thank you for consulting [facility] regarding this patient's care.    [facility] will sign off. Please re-consult with any new or worsening urologic concerns.      Renan Dillard, APRN, CNP  [facility]  [phone]

## 2022-02-15 NOTE — PROGRESS NOTES
Progress Note    Date of admission: 02/13/2022    Assessment/Plan  76 year old male with hx of insulin dependent DM type 2 with diabetic neuropathy, hypertension, atrial fibrillation on apixaban, hard of hearing, COPD, dyslipidemia, hx of bladder cancer, s/p lumbar spine surgery 3 days ago at [facility], presents with urinary retention and hypoxia    Community acquired bilateral lower lobe pneumonia:   - Noted on chest CT scan bilateral lower lobes.    - continue IV ceftriaxone and IV azithromycin.    -Consider changing to oral antibiotics tomorrow  - IS, f/u BC, sputum culture      Acute kidney injury with dehydration:   -Possible effects from urinary retention as well.   -Creatinine down trending  -Keep Killian catheter in place  -Urology consult pending      Acute respiratory failure with hypoxia:   - Due to pneumonia and also underlying COPD.    - Started on Respimat and incentive spirometry.      Postoperative urinary retention:   -Killian in place, continue tamsulosin, consult urology for post void planning.      Diabetic peripheral neuropathy:   -Resume carbamazepine.    - Hold gabapentin until mental status clears.      Primary hypertension:   -Resume amlodipine, hold atenolol, losartan for now until blood pressure rises      Hypercholesterolemia:   - Resume Crestor      Lumbar stenosis with neurogenic claudication:   - Status post lumbar spine surgery 3 days prior to admission.  Resume oxycodone for pain management.  Initiate scheduled Tylenol.  Continue Flexeril.      Sensorineural hearing loss, bilateral      Type 2 diabetes mellitus with complication:   -Lantus 10 units subcutaneous once daily  -medium dose insulin sliding scale  -Accu-Cheks  -Hypoglycemia protocol      Centrilobular emphysema: With hypoxia.  Started on Respimat.  As noted on chest CT      Atrial fibrillation:   -Was on apixaban prior to surgery.    -Resume apixaban today    Hyponatremia  -Improved after IV fluids,  relief of urinary  obstruction    DVT PPX : heparin    Barriers to discharge: Back Pain, hypoxia    Anticipated Discharge date  : Possibly tomorrow    Subjective  Patient states he feels better today than yesterday. walking around with help of PT.  no distress noted.  On 2 L oxygen via nasal cannula.    Objective  Vital signs in last 24 hours  Temp:  [97.9  F (36.6  C)-98.2  F (36.8  C)] 98.1  F (36.7  C)  Pulse:  [66-74] 70  Resp:  [12-22] 20  BP: (108-142)/(57-67) 120/57  SpO2:  [93 %-96 %] 94 %    Input and Output in 24 hrs     Intake/Output Summary (Last 24 hours) at 2/14/2022 1605  Last data filed at 2/14/2022 1400  Gross per 24 hour   Intake 1000 ml   Output 2550 ml   Net -1550 ml       Physical Exam:  GEN: Alert and oriented. Not in acute distress  HEENT: Atraumatic    Pupils- round and reactive to light bilaterally   Neck- supple, no JVP elevation, no lymphadenopathy or thyromegaly   Sclera- anicteric   Mucous membrane- moist and pink  CHEST: Clear to auscultation bilaterally  HEART: S1S2 regular. No murmurs, rubs or gallops  ABDOMEN: Soft. Non-tender, non-distended. No organomegaly. No guarding or rigidity. Bowel sounds- active, rosas catheter in place  Extremities: No pedal oedema, clean and dry dressing on lower back  CNS: No focal neurological deficit. No involuntary movements  SKIN: No skin rash, no cyanosis or clubbing      Pertinent Labs   Lab Results: Personally Reviewed    Recent Results (from the past 24 hour(s))   Glucose by meter    Collection Time: 02/14/22  5:17 PM   Result Value Ref Range    GLUCOSE BY METER POCT 156 (H) 70 - 99 mg/dL   Glucose by meter    Collection Time: 02/14/22  9:34 PM   Result Value Ref Range    GLUCOSE BY METER POCT 153 (H) 70 - 99 mg/dL   Glucose by meter    Collection Time: 02/15/22  5:30 AM   Result Value Ref Range    GLUCOSE BY METER POCT 115 (H) 70 - 99 mg/dL   CBC with platelets    Collection Time: 02/15/22  6:30 AM   Result Value Ref Range    WBC Count 5.2 4.0 - 11.0 10e3/uL    RBC  Count 3.64 (L) 4.40 - 5.90 10e6/uL    Hemoglobin 11.2 (L) 13.3 - 17.7 g/dL    Hematocrit 32.0 (L) 40.0 - 53.0 %    MCV 88 78 - 100 fL    MCH 30.8 26.5 - 33.0 pg    MCHC 35.0 31.5 - 36.5 g/dL    RDW 12.5 10.0 - 15.0 %    Platelet Count 204 150 - 450 10e3/uL   Basic metabolic panel    Collection Time: 02/15/22  6:30 AM   Result Value Ref Range    Sodium 138 136 - 145 mmol/L    Potassium 3.6 3.5 - 5.0 mmol/L    Chloride 101 98 - 107 mmol/L    Carbon Dioxide (CO2) 29 22 - 31 mmol/L    Anion Gap 8 5 - 18 mmol/L    Urea Nitrogen 25 8 - 28 mg/dL    Creatinine 0.82 0.70 - 1.30 mg/dL    Calcium 8.3 (L) 8.5 - 10.5 mg/dL    Glucose 125 70 - 125 mg/dL    GFR Estimate 90 >60 mL/min/1.73m2   Ionized Calcium    Collection Time: 02/15/22  6:30 AM   Result Value Ref Range    Calcium, Ionized pH 7.4 1.11 1.11 - 1.30 mmol/L    pH 7.34 (L) 7.35 - 7.45    Calcium, Ionized Measured 1.15 1.11 - 1.30 mmol/L   Glucose by meter    Collection Time: 02/15/22  7:58 AM   Result Value Ref Range    GLUCOSE BY METER POCT 130 (H) 70 - 99 mg/dL   Glucose by meter    Collection Time: 02/15/22 11:31 AM   Result Value Ref Range    GLUCOSE BY METER POCT 152 (H) 70 - 99 mg/dL       Pertinent Radiology   Radiology Results reviewed      EKG Results reviewed       Advanced Care Planning      Haroldo Camacho MD   [facility]

## 2022-02-15 NOTE — PROGRESS NOTES
"Orthopedic Progress Note      Assessment:    S/p lumbar spinal surgery 2/11     Plan:   - Continue PT/OT  - Weightbearing status: WBAT   - continue pain control prn  - Anticoagulation: eliquis per medicine in addition to SCDs, christiano stockings and early ambulation.  - Discharge planning: okay to discharge from ortho standpoint when medically cleared. Ortho will sign off, please contact with any questions.   followup with Dr. Black as scheduled in March.       Subjective:  Pain: moderate - reports pain was terrible last night, but now improved at this time.   Neuro:  Patient denies new onset numbness or paresthesias    Patient reports he had a bad flare of pain overnight, but improved today with pain medication. States he has been up with therapy and doing fine, other than pain across the back. He denies numbness, tingling, weakness down the legs. Has no complaints.     Objective:  /57   Pulse 70   Temp 98.1  F (36.7  C)   Resp 20   Ht 1.695 m (5' 6.75\")   Wt 104.7 kg (230 lb 14.4 oz)   SpO2 94%   BMI 36.44 kg/m    The patient is A&Ox3. Appears comfortable.   Sensation is intact BLEs.  Dorsiflexion and plantar flexion is intact with 5/5 motor strength.   Dorsalis pedis pulse intact.  Calves are soft and non-tender.       Pertinent Labs   Lab Results: personally reviewed.   Lab Results   Component Value Date    INR 1.19 (H) 12/06/2020    INR 1.18 (H) 12/05/2020    INR 1.10 12/04/2020     Lab Results   Component Value Date    WBC 5.2 02/15/2022    HGB 11.2 (L) 02/15/2022    HCT 32.0 (L) 02/15/2022    MCV 88 02/15/2022     02/15/2022     Lab Results   Component Value Date     02/15/2022    CO2 29 02/15/2022         Report completed by:  Aniyah Hunt PA-C, VÍCTOR  Date: 2/15/2022  Time: 2:34 PM    "

## 2022-02-15 NOTE — PLAN OF CARE
Problem: Adult Inpatient Plan of Care  Goal: Optimal Comfort and Wellbeing  Outcome: Improving   Rates back pain 6/10, denies need for narcotics but appears painful.  Explain all papin control options available and discuss patients reluctance to use narcotics. Continue to monitor.

## 2022-02-15 NOTE — PROGRESS NOTES
Care Management Follow Up    Length of Stay (days): 2    Expected Discharge Date: 02/16/2022     Concerns to be Addressed:       Patient plan of care discussed at interdisciplinary rounds: Yes    Anticipated Discharge Disposition:  Home with spouse with  outpatient follow up     Anticipated Discharge Services:  OT/PT recommendations home with assist  Anticipated Discharge DME:  none at this time    Patient/family educated on Medicare website which has current facility and service quality ratings: yes  Education Provided on the Discharge Plan:  yes  Patient/Family in Agreement with the Plan: yes     Referrals Placed by CM/SW: none at this time    Private pay costs discussed: Not applicable    Additional Information:  Chart reviewed. OT/PT recommendations home with assistance.      Benita Fitzgerald RN

## 2022-02-15 NOTE — PLAN OF CARE
Problem: Adult Inpatient Plan of Care  Goal: Plan of Care Review  Outcome: Improving     Problem: Risk for Delirium  Goal: Optimal Coping  Outcome: Improving     Problem: Diabetes Comorbidity  Goal: Blood Glucose Level Within Targeted Range  Outcome: Improving     Problem: Respiratory Compromise (Pneumonia)  Goal: Effective Oxygenation and Ventilation  Outcome: Improving    [id number].... Pt is alert and oriented x4, no s/sx of hallucination noted. Pt was up in the chair and worked with PT. He c/o lower back pain at the start of the shift and was medicated with PRN Percocet, med was effective, pain level went from 8 to 4/10. Lower back wound with sterri stripes is CDI, rosas cath is patent, output has been charted. Will continue to monitor.

## 2022-02-15 NOTE — PLAN OF CARE
Problem: Risk for Delirium  Goal: Optimal Coping  Outcome: Improving  Goal: Improved Behavioral Control  Outcome: Improving  Goal: Improved Attention and Thought Clarity  Outcome: Improving  Goal: Improved Sleep  Outcome: Improving     Problem: Respiratory Compromise (Pneumonia)  Goal: Effective Oxygenation and Ventilation  Outcome: Improving  Intervention: Promote Airway Secretion Clearance  Recent Flowsheet Documentation  Taken 2/14/2022 1955 by Kevan Thompson RN  Cough And Deep Breathing: done with encouragement  Intervention: Optimize Oxygenation and Ventilation  Recent Flowsheet Documentation  Taken 2/14/2022 1955 by Kevan Thompson RN  Head of Bed (HOB) Positioning: HOB at 20-30 degrees     Problem: Respiratory Compromise (Pneumonia)  Goal: Effective Oxygenation and Ventilation  Outcome: Improving  Intervention: Promote Airway Secretion Clearance  Recent Flowsheet Documentation  Taken 2/14/2022 1955 by Kevan Thompson RN  Cough And Deep Breathing: done with encouragement  Intervention: Optimize Oxygenation and Ventilation  Recent Flowsheet Documentation  Taken 2/14/2022 1955 by Kevan Thompson RN  Head of Bed (HOB) Positioning: HOB at 20-30 degrees     Reported to writer by previous nurse that Pt was hallucinating. PT reported seeing bugs on the floor that he knew were not there. Pt reports not sleeping well when asked. C/O pain to lower back from previous back surgery on 2/10/22. Incision to lower back remained CDI, Steri-strips present. No further hallucinations noted. One time dose of Toradol IV was effective. Writer encouraged Pt to walk multiple times throughout the day/ get up and move around to help ease back pain. Ice pack given and was effective as well. Pt assisted into bed where he has been sleeping comfortably. LS: Fine crackles heard bibasilary. IS initiated and pt showed proper use. Instructed on how to cough/deep breath as well. Continued on 2ltrs O2 via NC w/ SpO2: at 93%. Absent of any  noted dyspnea w/ exertion/walking. Killian catheter remained patent w/ clear yellow urine.

## 2022-02-16 ENCOUNTER — APPOINTMENT (OUTPATIENT)
Dept: PHYSICAL THERAPY | Facility: HOSPITAL | Age: 77
DRG: 193 | End: 2022-02-16
Payer: COMMERCIAL

## 2022-02-16 VITALS
SYSTOLIC BLOOD PRESSURE: 170 MMHG | HEART RATE: 66 BPM | WEIGHT: 230.9 LBS | HEIGHT: 67 IN | TEMPERATURE: 97.9 F | RESPIRATION RATE: 20 BRPM | DIASTOLIC BLOOD PRESSURE: 72 MMHG | OXYGEN SATURATION: 94 % | BODY MASS INDEX: 36.24 KG/M2

## 2022-02-16 LAB
GLUCOSE BLDC GLUCOMTR-MCNC: 108 MG/DL (ref 70–99)
GLUCOSE BLDC GLUCOMTR-MCNC: 154 MG/DL (ref 70–99)
GLUCOSE BLDC GLUCOMTR-MCNC: 98 MG/DL (ref 70–99)

## 2022-02-16 PROCEDURE — 97116 GAIT TRAINING THERAPY: CPT | Mod: GP

## 2022-02-16 PROCEDURE — 250N000013 HC RX MED GY IP 250 OP 250 PS 637: Performed by: STUDENT IN AN ORGANIZED HEALTH CARE EDUCATION/TRAINING PROGRAM

## 2022-02-16 PROCEDURE — 250N000013 HC RX MED GY IP 250 OP 250 PS 637: Performed by: INTERNAL MEDICINE

## 2022-02-16 PROCEDURE — 250N000011 HC RX IP 250 OP 636: Performed by: INTERNAL MEDICINE

## 2022-02-16 PROCEDURE — 99239 HOSP IP/OBS DSCHRG MGMT >30: CPT | Mod: GC | Performed by: INTERNAL MEDICINE

## 2022-02-16 RX ORDER — TAMSULOSIN HYDROCHLORIDE 0.4 MG/1
0.4 CAPSULE ORAL DAILY
Qty: 30 CAPSULE | Refills: 1 | Status: SHIPPED | OUTPATIENT
Start: 2022-02-17

## 2022-02-16 RX ORDER — CEFPODOXIME PROXETIL 200 MG/1
200 TABLET, FILM COATED ORAL 2 TIMES DAILY
Qty: 8 TABLET | Refills: 0 | Status: SHIPPED | OUTPATIENT
Start: 2022-02-16 | End: 2022-02-20

## 2022-02-16 RX ORDER — LOSARTAN POTASSIUM 100 MG/1
100 TABLET ORAL DAILY
Start: 2022-02-23

## 2022-02-16 RX ORDER — CHLORTHALIDONE 25 MG/1
25 TABLET ORAL DAILY
Start: 2022-02-23

## 2022-02-16 RX ORDER — AZITHROMYCIN 500 MG/1
500 TABLET, FILM COATED ORAL DAILY
Qty: 1 TABLET | Refills: 0 | Status: SHIPPED | OUTPATIENT
Start: 2022-02-16 | End: 2022-02-17

## 2022-02-16 RX ADMIN — AMLODIPINE BESYLATE 10 MG: 5 TABLET ORAL at 08:19

## 2022-02-16 RX ADMIN — INSULIN ASPART 1 UNITS: 100 INJECTION, SOLUTION INTRAVENOUS; SUBCUTANEOUS at 12:02

## 2022-02-16 RX ADMIN — APIXABAN 5 MG: 5 TABLET, FILM COATED ORAL at 08:20

## 2022-02-16 RX ADMIN — CYCLOBENZAPRINE 10 MG: 10 TABLET, FILM COATED ORAL at 14:39

## 2022-02-16 RX ADMIN — UMECLIDINIUM 1 PUFF: 62.5 AEROSOL, POWDER ORAL at 08:22

## 2022-02-16 RX ADMIN — DOCUSATE SODIUM AND SENNOSIDES 1 TABLET: 8.6; 5 TABLET, FILM COATED ORAL at 12:10

## 2022-02-16 RX ADMIN — CYCLOBENZAPRINE 10 MG: 10 TABLET, FILM COATED ORAL at 08:16

## 2022-02-16 RX ADMIN — ACETAMINOPHEN 975 MG: 325 TABLET ORAL at 08:18

## 2022-02-16 RX ADMIN — ACETAMINOPHEN 975 MG: 325 TABLET ORAL at 14:39

## 2022-02-16 RX ADMIN — HEPARIN SODIUM 5000 UNITS: 10000 INJECTION, SOLUTION INTRAVENOUS; SUBCUTANEOUS at 00:30

## 2022-02-16 RX ADMIN — CARBAMAZEPINE 100 MG: 200 TABLET ORAL at 08:21

## 2022-02-16 RX ADMIN — TAMSULOSIN HYDROCHLORIDE 0.4 MG: 0.4 CAPSULE ORAL at 08:17

## 2022-02-16 RX ADMIN — HEPARIN SODIUM 5000 UNITS: 10000 INJECTION, SOLUTION INTRAVENOUS; SUBCUTANEOUS at 12:02

## 2022-02-16 ASSESSMENT — ACTIVITIES OF DAILY LIVING (ADL)
ADLS_ACUITY_SCORE: 15

## 2022-02-16 NOTE — PLAN OF CARE
[id number]... Pt had a fairly quiet shift, he remains on IV antibiotics. Pain is being managed with scheduled Tylenol, no PRNs given. Killian cath is patent, output has been charted. Pt is alert and oriented x4, no hallucination noted, lower back incision is CDI, steri-strips intact. Will continue to monitor.

## 2022-02-16 NOTE — PLAN OF CARE
Physical Therapy Discharge Summary    Reason for therapy discharge:    Discharged to home.    Progress towards therapy goal(s). See goals on Care Plan in [facility] electronic health record for goal details.  Goals met    Therapy recommendation(s):    Continue home exercise program.

## 2022-02-16 NOTE — PLAN OF CARE
Occupational Therapy Discharge Summary    Reason for therapy discharge:    Discharged to home.    Progress towards therapy goal(s). See goals on Care Plan in [facility] electronic health record for goal details.  Goals partially met.  Barriers to achieving goals:   limited tolerance for therapy.    Therapy recommendation(s):    Home with family support as needed

## 2022-02-16 NOTE — PLAN OF CARE
Goal Outcome Evaluation:    Plan of Care Reviewed With: patient     Overall Patient Progress: improving    Outcome Evaluation: rosas in place, waiting for urology      Patient vital signs are stable.  Pain is controlled with oral medications.  Discharge instructions reviewed and questions answered.

## 2022-02-16 NOTE — DISCHARGE INSTRUCTIONS
Discharge Instructions: Caring for Your Leg Bag   You are going home with a urinary catheter and collection device (drainage bag) in place. One type of collection device is called a leg bag. This is a smaller drainage bag that you can wear on your leg to collect urine during the day. The bag can fit under your clothing. You can move around with greater ease when using a leg bag instead of a larger collection bag.   You were shown how to care for your catheter in the hospital. This sheet will help you remember those steps when you are at home.   Home care    Wash your hands thoroughly before and after you care for your catheter or collection device.    Gather your supplies:  ? Alcohol wipes  ? Soap and water  ? Towel and washcloth  ? Leg strap and leg bag    Use soap and water to wash the area where your catheter enters your body. Rinse well.    Secure the bag montgomery to your leg:  ? Put the leg band high on your thigh with the product label pointing away from your leg.  ? Stretch the leg band in place and fasten.  ? Place the catheter tubing over the bag and secure it. You may secure it with a Velcro tab or other method, depending on the product you use. Be sure to leave enough loop in the catheter above the leg band so you won't pull on the tube.  ? Every 4 to 6 hours, reposition the band. This will prevent pressure from the elastic on your leg. You can do this by changing the bag to the other leg or by raising or lowering the leg band.  ? Wash the band as often as needed. You can hand wash and dry the leg band.    Place the bag in the bag montgomery.    Clean the urine bag end of the catheter and your catheter port with an alcohol wipe.    Place a towel under the bag and port to keep urine from dripping onto your leg.    Before connecting the outlet valve at the bottom of the bag to the catheter, make sure that it is firmly closed. Flip the valve up toward the bag. It needs to snap firmly in place. Don't tug on the  tubing. Be gentle.    Attach the urine bag to the end of the catheter. Insert the connector snugly into the catheter port. You can prevent dribbling urine by bending the catheter tubing just below the tip and holding it while you disconnect it from the catheter. Be careful to keep the tip clean while connecting the leg bag tubing to the catheter. This keeps germs from getting into the system.    Drain the bag when it's full. To drain the bag, flip the clamp downward. Direct the flexible outlet tube to control the flow of urine. You don t have to disconnect the leg bag from the catheter to empty it. Raise your leg up to the edge of the toilet to reach the leg bag. Then you can empty the bag directly into the toilet. This way, you won t need to bend over, which may be uncomfortable.    Keep the leg bag clean. Your healthcare provider may advise that you use a certain solution to clean the bag. Solutions that may be advised include:  ? 2 parts vinegar and 3 parts water  ? 1 tablespoon of chlorine bleach mixed with a half cup of water    Ask your healthcare provider how often you should clean your bag and what solution you should use ?to reduce odor and keep the bag free of germs.    Shake the solution a bit and allow it to remain in the bag for 30 minutes.    Drain the solution and rinse the bag with cold tap water.    Hang the bag to drain and air dry.    Remember to keep the drainage bag below the level of your bladder for correct drainage.    Follow-up  Make a follow-up appointment as directed by your healthcare provider.  When to call your healthcare provider  Call your healthcare provider right away if you have any of the following:    Redness, swelling, or warmth around the catheter entry site    Pus draining from your catheter entry site or into the catheter tubing and bag    Blood, clots, or floating debris in the urine    Nausea and vomiting    Shaking chills    Fever above 100.4 F ( 38 C), or as directed by  your provider    Pain that is not eased by medicine     Catheter that falls out or is dislodged  Graham last reviewed this educational content on 10/1/2019    7275-7450 [employer]. All rights reserved. This information is not intended as a substitute for professional medical care. Always follow your healthcare professional's instructions.        Killian Catheter Care     A Killian catheter is a rubber tube that is placed through the urethra and into the bladder. The urethra is the opening where urine comes out. The catheter helps drain urine from the bladder. There is a small balloon on the end of the tube that is inflated after the catheter is put in place. This keeps the catheter from sliding out of the bladder.  A Killian catheter is used when you are unable to pass urine (urinary retention). It's also used when there is loss of bladder control (incontinence).  Home care    Finish taking any prescribed antibiotic medicine even if you are feeling better before then.    It's important to keep bacteria from getting into the collection bag. Don't disconnect the catheter from the collection bag.    Use a leg band to secure the drainage tube, so it doesn't pull on the catheter. Drain the collection bag when it becomes full using the drain spout at the bottom of the bag.    Don't try to pull or remove your catheter. This will injure your urethra. It must be removed by your healthcare provider or nurse.    Follow-up care  Follow up with your healthcare provider, or as advised. This is for repeat urine testing and for catheter removal or replacement.  When to seek medical advice  Call your healthcare provider right away if any of these occur:    Fever of 100.4 F (38 C) or higher, or as directed by your healthcare provider    Bladder pain or fullness    Abdominal swelling, nausea or vomiting, or back pain    Blood or urine leakage around the catheter    Bloody urine coming from the catheter (if a new  symptom)    Catheter falls out    Catheter stops draining for 6 hours    Weakness, dizziness, or fainting  Graham last reviewed this educational content on 9/1/2019 2000-2021 [employer]. All rights reserved. This information is not intended as a substitute for professional medical care. Always follow your healthcare professional's instructions.

## 2022-02-16 NOTE — PLAN OF CARE
Problem: Diabetes Comorbidity  Goal: Blood Glucose Level Within Targeted Range  Outcome: Ongoing, Progressing     Problem: Respiratory Compromise (Pneumonia)  Goal: Effective Oxygenation and Ventilation  Outcome: Ongoing, Progressing  Incision to lower back remained CDI, Steri-strips present.  LS: Fine crackles heard bibasilary. Continued on 1ltrs O2 via NC w/ SpO2: at 93%. Killian catheter remained patent w/ clear yellow urine. Slept comfortably overnight. A&Ox4. Refused/denied need for any pain medication/intervention. Able to make moderate position changes in bed on own.

## 2022-02-17 ENCOUNTER — PATIENT OUTREACH (OUTPATIENT)
Dept: CARE COORDINATION | Facility: CLINIC | Age: 77
End: 2022-02-17
Payer: MEDICARE

## 2022-02-17 DIAGNOSIS — Z71.89 OTHER SPECIFIED COUNSELING: ICD-10-CM

## 2022-02-17 NOTE — PROGRESS NOTES
Clinic Care Coordination Contact  [facility]/Voicemail       Clinical Data: Care Coordinator Outreach  Outreach attempted x 1.  Left message on patient's voicemail with call back information and requested return call.  Care Coordinator will try to reach patient again in 1-2 business days.      Kenyetta Jon  [phone]  Care

## 2022-02-18 NOTE — PROGRESS NOTES
Clinic Care Coordination Contact  [facility]/Voicemail       Clinical Data: Care Coordinator Outreach  Outreach attempted x 2.  Left message on patient's voicemail with call back information and requested return call.  PCare Coordinator will do no further outreaches at this time.      Kenyetta Jon  [phone]  Care

## 2022-02-22 LAB
ATRIAL RATE - MUSE: 61 BPM
DIASTOLIC BLOOD PRESSURE - MUSE: 55 MMHG
INTERPRETATION ECG - MUSE: NORMAL
P AXIS - MUSE: 11 DEGREES
PR INTERVAL - MUSE: 164 MS
QRS DURATION - MUSE: 86 MS
QT - MUSE: 420 MS
QTC - MUSE: 422 MS
R AXIS - MUSE: -59 DEGREES
SYSTOLIC BLOOD PRESSURE - MUSE: 114 MMHG
T AXIS - MUSE: 22 DEGREES
VENTRICULAR RATE- MUSE: 61 BPM

## 2022-03-02 NOTE — DISCHARGE SUMMARY
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    - managed with  IV ceftriaxone and IV azithromycin and switched to azithromycin ( for total of 3 days ) and cefpodoxime( for total of 7 days )          Acute kidney injury with dehydration:   -Possible effects from urinary retention as well.   -Creatinine down trending  -Keep Killian catheter in place  -Urology consulted :     Recommend maintaining catheter for 1-2 weeks, allowing time for Flomax to work, resolution of constipation and for patient to wean off Percocet. Email sent to arrange trial void as an outpatient and [facility] contact info placed in discharge instructions.            Acute respiratory failure with hypoxia:   - Due to pneumonia and also underlying COPD.    - Started on Respimat and incentive spirometry.  resolved         Postoperative urinary retention:   -Killian in place, continue tamsulosin  Recommend maintaining catheter for 1-2 weeks, allowing time for Flomax to work, resolution of constipation and for patient to wean off Percocet. Email sent to arrange trial void as an outpatient and [facility] contact info placed in discharge instructions.            Diabetic peripheral neuropathy:   -Resume carbamazepine.    - Hold gabapentin until mental status clears.         Primary hypertension:   -Resume amlodipine, hold atenolol, losartan for now until  blood pressure rises         Hypercholesterolemia:   - Resume Crestor         Lumbar stenosis with neurogenic claudication:   - Status post lumbar spine surgery 3 days prior to admission.  Resume oxycodone for pain management.  Initiate scheduled Tylenol.  Continue Flexeril.         Sensorineural hearing loss, bilateral         Type 2 diabetes mellitus with complication:   -Lantus 10 units subcutaneous once daily  -medium dose insulin sliding scale  -Accu-Cheks  -Hypoglycemia protocol         Centrilobular emphysema: With hypoxia.  Started on Respimat.  As noted on chest CT, stable         Atrial fibrillation:   -Was on apixaban prior to surgery.    -Resume apixaban        Hyponatremia  -Improved after IV fluids,  relief of urinary obstruction    Follow-ups Needed After Discharge   Follow-up Appointments     Follow-up and recommended labs and tests      [facility] will call you to arrange trial void (catheter removal attempt)   in 1-2 weeks. You may call to confirm appointment as needed. [place]   [phone]         Follow-up and recommended labs and tests       Follow up with primary care provider, Kevan Srivastava, within 7 days   for hospital follow- up.  The following labs/tests are recommended: BMP on   2/21/22.     [facility] contact info placed in discharge instructions. Follow up as   advised for voiding trial         {Additional follow-up instructions/to-do's for PCP    :BMP    Unresulted Labs Ordered in the Past 30 Days of this Admission     No orders found from 1/14/2022 to 2/14/2022.      These results will be followed up by pcp    Discharge Disposition   Discharged to home  Condition at discharge: Stable        Hospital Course   See above    Consultations This Hospital Stay   SOCIAL WORK IP CONSULT  UROLOGY IP CONSULT  ORTHOPEDIC SURGERY IP CONSULT  PHYSICAL THERAPY ADULT IP CONSULT  OCCUPATIONAL THERAPY ADULT IP CONSULT  UROLOGY IP CONSULT    Code Status   Prior    Time  Spent on this Encounter   I, Elfego Meneses MD, personally saw the patient today and spent greater than 30 minutes discharging this patient.       Elfego Meneses MD  [address] [address]  Phone: [phone]  Fax: [phone]  ______________________________________________________________________    Physical Exam   Vital Signs:                   Weight: 230 lbs 14.4 oz         GENERAL: The patient is not in any acute distressed. Awake and alert.  HEENT: Nonicteric sclerae, PERRLA, EOMI. Oropharynx clear. Moist mucous membranes. Conjunctivae appear well perfused.  HEART: Regular rate and rhythm without murmurs.  LUNGS: Clear to auscultation bilaterally. No wheezing or crackles.  ABDOMEN: Soft, positive bowel sounds, nontender.  SKIN: No rash, no excessive bruising, petechiae, or purpura.  EXTREMITIES : no rashes, no swelling in legs.  NEUROLOGIC: conscious and oriented, follows commands, no obvious focal deficits.  ROS: All other systems negative          Primary Care Physician   Kevan Srivastava    Discharge Orders      Basic metabolic panel     Follow-up and recommended labs and tests    [facility] will call you to arrange trial void (catheter removal attempt) in 1-2 weeks. You may call to confirm appointment as needed. [place] [facility], [phone]     Reason for your hospital stay    sob     Follow-up and recommended labs and tests     Follow up with primary care provider, Kevan Srivastava, within 7 days for hospital follow- up.  The following labs/tests are recommended: BMP on 2/21/22.     [facility] contact info placed in discharge instructions. Follow up as advised for voiding trial     Activity    Your activity upon discharge: activity as tolerated     Diet    Follow this diet upon discharge: Orders Placed This Encounter      Moderate Consistent Carb (60 g CHO per Meal) Diet       Significant Results and Procedures   Most  Recent 3 CBC's:Recent Labs   Lab Test 02/15/22  0630 02/13/22  1451 12/06/20  0619   WBC 5.2 10.0 7.6   HGB 11.2* 12.3* 12.4*   MCV 88 87 86    217 262     Most Recent 3 BMP's:Recent Labs   Lab Test 02/16/22  1138 02/16/22  0752 02/16/22  0240 02/15/22  0758 02/15/22  0630 02/14/22  0758 02/14/22  0641 02/14/22  0114 02/13/22  1735   NA  --   --   --   --  138  --  134*  --  131*   POTASSIUM  --   --   --   --  3.6  --  3.5  --  3.3*   CHLORIDE  --   --   --   --  101  --  99  --  93*   CO2  --   --   --   --  29  --  27  --  24   BUN  --   --   --   --  25  --  38*  --  45*   CR  --   --   --   --  0.82  --  1.25  --  2.19*   ANIONGAP  --   --   --   --  8  --  8  --  14   KEO  --   --   --   --  8.3*  --  7.7*  --  8.1*   * 108* 98   < > 125   < > 105   < > 77    < > = values in this interval not displayed.       Discharge Medications   Discharge Medication List as of 2/16/2022  2:17 PM      START taking these medications    Details   apixaban ANTICOAGULANT (ELIQUIS) 5 MG tablet Take 1 tablet (5 mg) by mouth 2 times daily, Disp-60 tablet, R-1, E-Prescribe      azithromycin (ZITHROMAX) 500 MG tablet Take 1 tablet (500 mg) by mouth daily for 1 day, Disp-1 tablet, R-0, E-Prescribe      cefpodoxime (VANTIN) 200 MG tablet Take 1 tablet (200 mg) by mouth 2 times daily for 4 days, Disp-8 tablet, R-0, E-Prescribe      tamsulosin (FLOMAX) 0.4 MG capsule Take 1 capsule (0.4 mg) by mouth daily, Disp-30 capsule, R-1, E-Prescribe         CONTINUE these medications which have CHANGED    Details   chlorthalidone (HYGROTON) 25 MG tablet Take 1 tablet (25 mg) by mouth daily Resume  In a week if creatinine stable, No Print Out      insulin glargine (LANTUS PEN) 100 UNIT/ML pen Inject 20 Units Subcutaneous At Bedtime Adjust lantus dose based on blood sugar levels. Try to avoid low blood sugar < 70 and avoid blood sugars > 200-250, Disp-15 mL, No Print OutIf Lantus is not covered by insurance, may substitute Basaglar or  Semglee  or other insulin glargine product per insurance preference at same dose and frequency.        losartan (COZAAR) 100 MG tablet Take 1 tablet (100 mg) by mouth daily Resume in a week if creatinine stable, No Print Out         CONTINUE these medications which have NOT CHANGED    Details   acetaminophen (TYLENOL) 325 MG tablet Take 325-650 mg by mouth every 6 hours as needed for mild pain, Historical      amLODIPine (NORVASC) 10 MG tablet [AMLODIPINE (NORVASC) 10 MG TABLET] Take 10 mg by mouth daily., Historical      aspirin 81 MG EC tablet Take 81 mg by mouth every evening, Historical      atenoloL (TENORMIN) 100 MG tablet [ATENOLOL (TENORMIN) 100 MG TABLET] Take 100 mg by mouth daily., Historical      carBAMazepine (TEGRETOL) 200 mg tablet [CARBAMAZEPINE (TEGRETOL) 200 MG TABLET] Take 100 mg by mouth 2 (two) times a day., Historical      cyclobenzaprine (FLEXERIL) 10 MG tablet Take 10 mg by mouth 3 times daily as needed for muscle spasms, Historical      gabapentin (NEURONTIN) 300 MG capsule Take 300 mg by mouth 2 times daily, Historical      loratadine (CLARITIN) 10 MG tablet Take 10 mg by mouth daily as needed for allergies, Historical      multivitamin w/minerals (MULTI-VITAMIN) tablet Take 1 tablet by mouth daily, Historical      rosuvastatin (CRESTOR) 20 MG tablet [ROSUVASTATIN (CRESTOR) 20 MG TABLET] Take 20 mg by mouth at bedtime., Historical         STOP taking these medications       oxyCODONE (ROXICODONE) 5 MG tablet Comments:   Reason for Stopping:             Allergies   No Known Allergies